# Patient Record
Sex: FEMALE | Race: OTHER | HISPANIC OR LATINO | URBAN - METROPOLITAN AREA
[De-identification: names, ages, dates, MRNs, and addresses within clinical notes are randomized per-mention and may not be internally consistent; named-entity substitution may affect disease eponyms.]

---

## 2017-11-29 ENCOUNTER — EMERGENCY (EMERGENCY)
Facility: HOSPITAL | Age: 82
LOS: 1 days | Discharge: HOME | End: 2017-11-29
Attending: EMERGENCY MEDICINE | Admitting: EMERGENCY MEDICINE
Payer: MEDICARE

## 2017-11-29 VITALS
SYSTOLIC BLOOD PRESSURE: 167 MMHG | HEIGHT: 60 IN | HEART RATE: 81 BPM | TEMPERATURE: 97 F | WEIGHT: 125 LBS | OXYGEN SATURATION: 98 % | DIASTOLIC BLOOD PRESSURE: 89 MMHG | RESPIRATION RATE: 16 BRPM

## 2017-11-29 DIAGNOSIS — N39.0 URINARY TRACT INFECTION, SITE NOT SPECIFIED: ICD-10-CM

## 2017-11-29 DIAGNOSIS — I10 ESSENTIAL (PRIMARY) HYPERTENSION: ICD-10-CM

## 2017-11-29 DIAGNOSIS — R35.0 FREQUENCY OF MICTURITION: ICD-10-CM

## 2017-11-29 DIAGNOSIS — E78.5 HYPERLIPIDEMIA, UNSPECIFIED: ICD-10-CM

## 2017-11-29 LAB
APPEARANCE UR: CLEAR — SIGNIFICANT CHANGE UP
BILIRUB UR-MCNC: NEGATIVE — SIGNIFICANT CHANGE UP
COLOR SPEC: YELLOW — SIGNIFICANT CHANGE UP
DIFF PNL FLD: (no result)
GLUCOSE UR QL: NEGATIVE — SIGNIFICANT CHANGE UP
KETONES UR-MCNC: NEGATIVE — SIGNIFICANT CHANGE UP
LEUKOCYTE ESTERASE UR-ACNC: (no result)
NITRITE UR-MCNC: NEGATIVE — SIGNIFICANT CHANGE UP
PH UR: 6.5 — SIGNIFICANT CHANGE UP (ref 5–8)
PROT UR-MCNC: NEGATIVE MG/DL — SIGNIFICANT CHANGE UP
SP GR SPEC: 1.01 — SIGNIFICANT CHANGE UP (ref 1–1.03)
UROBILINOGEN FLD QL: 0.2 E.U./DL — SIGNIFICANT CHANGE UP

## 2017-11-29 PROCEDURE — 81001 URINALYSIS AUTO W/SCOPE: CPT

## 2017-11-29 PROCEDURE — 99283 EMERGENCY DEPT VISIT LOW MDM: CPT

## 2017-11-29 PROCEDURE — 99284 EMERGENCY DEPT VISIT MOD MDM: CPT

## 2017-11-29 PROCEDURE — 87086 URINE CULTURE/COLONY COUNT: CPT

## 2017-11-29 NOTE — ED ADULT NURSE NOTE - OBJECTIVE STATEMENT
81 y/o female c/o bilateral flank pain worse on the right left side, lower abdominal pain, urinary frequency and burning on urination for "a while." denies chest pain, n/v/d, fever/chills, hematuria.

## 2017-11-29 NOTE — ED ADULT NURSE NOTE - OTHER COMPLAINTS
Reports pain starts in back and radiates to pelvic area. C/o burning on urination. Patient reports symptoms started 2 weeks ago. Denies any fevers.

## 2017-11-29 NOTE — ED PROVIDER NOTE - MEDICAL DECISION MAKING DETAILS
81 y/o female c/o dysuria, urinary frequency/urgency. PE: unremarkable, no CVAT. VS nml. UA shows UTI - will treat and has fu with her PMD.

## 2017-11-29 NOTE — ED ADULT TRIAGE NOTE - CHIEF COMPLAINT QUOTE
"I have pain in my back and in the front. I also have pain in my left leg. I feel something strange when I pee."

## 2017-11-29 NOTE — ED PROVIDER NOTE - CARE PLAN
Principal Discharge DX:	UTI (urinary tract infection)  Instructions for follow-up, activity and diet:	Please follow up with your doctor at [facility].

## 2017-11-29 NOTE — ED PROVIDER NOTE - ATTENDING CONTRIBUTION TO CARE
81 y/o female with a PMH of HTN, MI (x2 stents on clopidogrel), HLD and GERD is present with urinary frequency x1 month. Pt reports multiple trips to the bathroom a day with a burning sensation with urination. In addition pt reports b/l lower back pain with a discomfort in the suprapubic area that is relieved with urination. Pt AAO, NAD, RRR, CTA b/l, abd: soft/NT. UA noted and pt with UTI. Rx given for abx and pt to f/up outpt.

## 2017-11-29 NOTE — ED PROVIDER NOTE - OBJECTIVE STATEMENT
83 y/o female with a PMHx of HTN, MI (x2 stents on clopidogrel), HLD and GERD is present with urinary frequency x1 month. Pt reports multiple trips to the bathroom a day with a burning sensation with urination. In addition pt reports b/l lower back pain with a discomfort in the suprapubic area that is relieved with urination. She saw her PMD a month ago, was told to exercise. She denies the following: fever, chills, abdominal pain, diarrhea, constipation, vaginal discharge, blood in urine.

## 2017-11-30 LAB
CULTURE RESULTS: SIGNIFICANT CHANGE UP
SPECIMEN SOURCE: SIGNIFICANT CHANGE UP

## 2018-02-13 ENCOUNTER — EMERGENCY (EMERGENCY)
Facility: HOSPITAL | Age: 83
LOS: 1 days | Discharge: HOME | End: 2018-02-13
Admitting: EMERGENCY MEDICINE
Payer: MEDICARE

## 2018-02-13 VITALS
WEIGHT: 104.94 LBS | OXYGEN SATURATION: 99 % | RESPIRATION RATE: 18 BRPM | HEART RATE: 91 BPM | SYSTOLIC BLOOD PRESSURE: 158 MMHG | DIASTOLIC BLOOD PRESSURE: 84 MMHG | TEMPERATURE: 97 F

## 2018-02-13 DIAGNOSIS — I10 ESSENTIAL (PRIMARY) HYPERTENSION: ICD-10-CM

## 2018-02-13 DIAGNOSIS — Z79.82 LONG TERM (CURRENT) USE OF ASPIRIN: ICD-10-CM

## 2018-02-13 DIAGNOSIS — H92.01 OTALGIA, RIGHT EAR: ICD-10-CM

## 2018-02-13 DIAGNOSIS — E78.5 HYPERLIPIDEMIA, UNSPECIFIED: ICD-10-CM

## 2018-02-13 DIAGNOSIS — Z79.899 OTHER LONG TERM (CURRENT) DRUG THERAPY: ICD-10-CM

## 2018-02-13 PROCEDURE — 99282 EMERGENCY DEPT VISIT SF MDM: CPT

## 2018-02-13 NOTE — ED PROVIDER NOTE - ENMT, MLM
right ear no auricular tenderness, canal clear, TM intact, no erythema or bulging, no tenderness to TMJ, no dental tenderness, no facial swelling

## 2018-02-13 NOTE — ED ADULT TRIAGE NOTE - CHIEF COMPLAINT QUOTE
right "mandible" pain with eating and talking, and right ear pain right "mandible" pain with eating and talking, and right ear pain for the past month

## 2018-02-13 NOTE — ED PROVIDER NOTE - MEDICAL DECISION MAKING DETAILS
81 y/o right ear pain x 1 month; no evidence of infection, pt well appearing, ambulatory, will arrange ENT f/u, continue tylenol PRN pain

## 2018-02-13 NOTE — ED ADULT NURSE NOTE - OBJECTIVE STATEMENT
Pt presents to ED c/o jaw pain x1 month. Pt reports onset of R jaw pain with eating for one month, then radiation of pain to R ear for the last two weeks. Pt denies dizziness, but states "but my balance hasn't been very good." Pt denies head trauma, dizziness/lightheadedness, visual changes, numbness/tingling, N/V. Pt presents in NAD speaking full sentences ambulatory through triage with steady gait.

## 2019-03-16 ENCOUNTER — EMERGENCY (EMERGENCY)
Facility: HOSPITAL | Age: 84
LOS: 1 days | Discharge: HOME | End: 2019-03-16
Attending: EMERGENCY MEDICINE | Admitting: EMERGENCY MEDICINE
Payer: MEDICARE

## 2019-03-16 VITALS
TEMPERATURE: 98 F | HEART RATE: 96 BPM | OXYGEN SATURATION: 97 % | DIASTOLIC BLOOD PRESSURE: 85 MMHG | SYSTOLIC BLOOD PRESSURE: 142 MMHG | RESPIRATION RATE: 18 BRPM

## 2019-03-16 VITALS
SYSTOLIC BLOOD PRESSURE: 164 MMHG | HEIGHT: 60 IN | HEART RATE: 89 BPM | RESPIRATION RATE: 16 BRPM | TEMPERATURE: 97 F | DIASTOLIC BLOOD PRESSURE: 79 MMHG | OXYGEN SATURATION: 97 % | WEIGHT: 134.92 LBS

## 2019-03-16 PROBLEM — E78.5 HYPERLIPIDEMIA, UNSPECIFIED: Chronic | Status: ACTIVE | Noted: 2018-02-13

## 2019-03-16 PROBLEM — I10 ESSENTIAL (PRIMARY) HYPERTENSION: Chronic | Status: ACTIVE | Noted: 2018-02-13

## 2019-03-16 LAB
ALBUMIN SERPL ELPH-MCNC: 4.3 G/DL — SIGNIFICANT CHANGE UP (ref 3.3–5)
ALP SERPL-CCNC: 104 U/L — SIGNIFICANT CHANGE UP (ref 40–120)
ALT FLD-CCNC: 11 U/L — SIGNIFICANT CHANGE UP (ref 10–45)
ANION GAP SERPL CALC-SCNC: 11 MMOL/L — SIGNIFICANT CHANGE UP (ref 5–17)
APPEARANCE UR: CLEAR — SIGNIFICANT CHANGE UP
AST SERPL-CCNC: 16 U/L — SIGNIFICANT CHANGE UP (ref 10–40)
BACTERIA # UR AUTO: PRESENT /HPF
BASOPHILS # BLD AUTO: 0.03 K/UL — SIGNIFICANT CHANGE UP (ref 0–0.2)
BASOPHILS NFR BLD AUTO: 0.5 % — SIGNIFICANT CHANGE UP (ref 0–2)
BILIRUB SERPL-MCNC: 0.4 MG/DL — SIGNIFICANT CHANGE UP (ref 0.2–1.2)
BILIRUB UR-MCNC: NEGATIVE — SIGNIFICANT CHANGE UP
BUN SERPL-MCNC: 13 MG/DL — SIGNIFICANT CHANGE UP (ref 7–23)
CALCIUM SERPL-MCNC: 9.4 MG/DL — SIGNIFICANT CHANGE UP (ref 8.4–10.5)
CHLORIDE SERPL-SCNC: 104 MMOL/L — SIGNIFICANT CHANGE UP (ref 96–108)
CO2 SERPL-SCNC: 27 MMOL/L — SIGNIFICANT CHANGE UP (ref 22–31)
COLOR SPEC: YELLOW — SIGNIFICANT CHANGE UP
CREAT SERPL-MCNC: 0.8 MG/DL — SIGNIFICANT CHANGE UP (ref 0.5–1.3)
DIFF PNL FLD: NEGATIVE — SIGNIFICANT CHANGE UP
EOSINOPHIL # BLD AUTO: 0.04 K/UL — SIGNIFICANT CHANGE UP (ref 0–0.5)
EOSINOPHIL NFR BLD AUTO: 0.7 % — SIGNIFICANT CHANGE UP (ref 0–6)
EPI CELLS # UR: ABNORMAL /HPF (ref 0–5)
GLUCOSE SERPL-MCNC: 107 MG/DL — HIGH (ref 70–99)
GLUCOSE UR QL: NEGATIVE — SIGNIFICANT CHANGE UP
HCT VFR BLD CALC: 41.6 % — SIGNIFICANT CHANGE UP (ref 34.5–45)
HGB BLD-MCNC: 13.6 G/DL — SIGNIFICANT CHANGE UP (ref 11.5–15.5)
IMM GRANULOCYTES NFR BLD AUTO: 0.2 % — SIGNIFICANT CHANGE UP (ref 0–1.5)
KETONES UR-MCNC: NEGATIVE — SIGNIFICANT CHANGE UP
LEUKOCYTE ESTERASE UR-ACNC: ABNORMAL
LIDOCAIN IGE QN: 21 U/L — SIGNIFICANT CHANGE UP (ref 7–60)
LYMPHOCYTES # BLD AUTO: 2.03 K/UL — SIGNIFICANT CHANGE UP (ref 1–3.3)
LYMPHOCYTES # BLD AUTO: 34.4 % — SIGNIFICANT CHANGE UP (ref 13–44)
MCHC RBC-ENTMCNC: 29.1 PG — SIGNIFICANT CHANGE UP (ref 27–34)
MCHC RBC-ENTMCNC: 32.7 GM/DL — SIGNIFICANT CHANGE UP (ref 32–36)
MCV RBC AUTO: 88.9 FL — SIGNIFICANT CHANGE UP (ref 80–100)
MONOCYTES # BLD AUTO: 0.39 K/UL — SIGNIFICANT CHANGE UP (ref 0–0.9)
MONOCYTES NFR BLD AUTO: 6.6 % — SIGNIFICANT CHANGE UP (ref 2–14)
NEUTROPHILS # BLD AUTO: 3.4 K/UL — SIGNIFICANT CHANGE UP (ref 1.8–7.4)
NEUTROPHILS NFR BLD AUTO: 57.6 % — SIGNIFICANT CHANGE UP (ref 43–77)
NITRITE UR-MCNC: NEGATIVE — SIGNIFICANT CHANGE UP
NRBC # BLD: 0 /100 WBCS — SIGNIFICANT CHANGE UP (ref 0–0)
PH UR: 6.5 — SIGNIFICANT CHANGE UP (ref 5–8)
PLATELET # BLD AUTO: 254 K/UL — SIGNIFICANT CHANGE UP (ref 150–400)
POTASSIUM SERPL-MCNC: 4.4 MMOL/L — SIGNIFICANT CHANGE UP (ref 3.5–5.3)
POTASSIUM SERPL-SCNC: 4.4 MMOL/L — SIGNIFICANT CHANGE UP (ref 3.5–5.3)
PROT SERPL-MCNC: 8.2 G/DL — SIGNIFICANT CHANGE UP (ref 6–8.3)
PROT UR-MCNC: NEGATIVE MG/DL — SIGNIFICANT CHANGE UP
RBC # BLD: 4.68 M/UL — SIGNIFICANT CHANGE UP (ref 3.8–5.2)
RBC # FLD: 12.2 % — SIGNIFICANT CHANGE UP (ref 10.3–14.5)
RBC CASTS # UR COMP ASSIST: < 5 /HPF — SIGNIFICANT CHANGE UP
SODIUM SERPL-SCNC: 142 MMOL/L — SIGNIFICANT CHANGE UP (ref 135–145)
SP GR SPEC: 1.01 — SIGNIFICANT CHANGE UP (ref 1–1.03)
UROBILINOGEN FLD QL: 0.2 E.U./DL — SIGNIFICANT CHANGE UP
WBC # BLD: 5.9 K/UL — SIGNIFICANT CHANGE UP (ref 3.8–10.5)
WBC # FLD AUTO: 5.9 K/UL — SIGNIFICANT CHANGE UP (ref 3.8–10.5)
WBC UR QL: > 10 /HPF

## 2019-03-16 PROCEDURE — 80053 COMPREHEN METABOLIC PANEL: CPT

## 2019-03-16 PROCEDURE — 99284 EMERGENCY DEPT VISIT MOD MDM: CPT

## 2019-03-16 PROCEDURE — 83690 ASSAY OF LIPASE: CPT

## 2019-03-16 PROCEDURE — 85025 COMPLETE CBC W/AUTO DIFF WBC: CPT

## 2019-03-16 PROCEDURE — 81001 URINALYSIS AUTO W/SCOPE: CPT

## 2019-03-16 PROCEDURE — 96374 THER/PROPH/DIAG INJ IV PUSH: CPT | Mod: XU

## 2019-03-16 PROCEDURE — 36415 COLL VENOUS BLD VENIPUNCTURE: CPT

## 2019-03-16 PROCEDURE — 74177 CT ABD & PELVIS W/CONTRAST: CPT

## 2019-03-16 PROCEDURE — 99284 EMERGENCY DEPT VISIT MOD MDM: CPT | Mod: 25

## 2019-03-16 PROCEDURE — 74177 CT ABD & PELVIS W/CONTRAST: CPT | Mod: 26

## 2019-03-16 PROCEDURE — 87086 URINE CULTURE/COLONY COUNT: CPT

## 2019-03-16 RX ORDER — CEPHALEXIN 500 MG
1 CAPSULE ORAL
Qty: 14 | Refills: 0
Start: 2019-03-16 | End: 2019-03-22

## 2019-03-16 RX ORDER — FAMOTIDINE 10 MG/ML
20 INJECTION INTRAVENOUS ONCE
Qty: 0 | Refills: 0 | Status: COMPLETED | OUTPATIENT
Start: 2019-03-16 | End: 2019-03-16

## 2019-03-16 RX ORDER — IOHEXOL 300 MG/ML
30 INJECTION, SOLUTION INTRAVENOUS ONCE
Qty: 0 | Refills: 0 | Status: COMPLETED | OUTPATIENT
Start: 2019-03-16 | End: 2019-03-16

## 2019-03-16 RX ORDER — SODIUM CHLORIDE 9 MG/ML
1000 INJECTION INTRAMUSCULAR; INTRAVENOUS; SUBCUTANEOUS ONCE
Qty: 0 | Refills: 0 | Status: COMPLETED | OUTPATIENT
Start: 2019-03-16 | End: 2019-03-16

## 2019-03-16 RX ADMIN — SODIUM CHLORIDE 1000 MILLILITER(S): 9 INJECTION INTRAMUSCULAR; INTRAVENOUS; SUBCUTANEOUS at 16:18

## 2019-03-16 RX ADMIN — IOHEXOL 30 MILLILITER(S): 300 INJECTION, SOLUTION INTRAVENOUS at 16:20

## 2019-03-16 RX ADMIN — FAMOTIDINE 20 MILLIGRAM(S): 10 INJECTION INTRAVENOUS at 16:20

## 2019-03-16 NOTE — ED ADULT NURSE NOTE - OBJECTIVE STATEMENT
Pt presents to ED with c/o lower abdominal pain radiating to back x1 month. Denies nvd, urinary sx, or vaginal discharge/bleeding. No fever/chills.

## 2019-03-16 NOTE — ED PROVIDER NOTE - NSFOLLOWUPINSTRUCTIONS_ED_ALL_ED_FT
Please see your primary care provider in 24-48 hours.  Return to the ER if symptoms worsen or other concerns.  May take tylenol 2 pills every 6 hours as needed for mild/ moderate pain    Urinary Tract Infection    A urinary tract infection (UTI) is an infection of any part of the urinary tract, which includes the kidneys, ureters, bladder, and urethra. Risk factors include ignoring your need to urinate, wiping back to front if female, being an uncircumcised male, and having diabetes or a weak immune system. Symptoms include frequent urination, pain or burning with urination, foul smelling urine, cloudy urine, pain in the lower abdomen, blood in the urine, and fever. If you were prescribed an antibiotic medicine, take it as told by your health care provider. Do not stop taking the antibiotic even if you start to feel better.    SEEK IMMEDIATE MEDICAL CARE IF YOU HAVE ANY OF THE FOLLOWING SYMPTOMS: severe back or abdominal pain, fever, inability to keep fluids or medicine down, dizziness/lightheadedness, or a change in mental status.    Acute Abdominal Pain    WHAT YOU NEED TO KNOW:    The cause of your abdominal pain may not be found. If a cause is found, treatment will depend on what the cause is.     DISCHARGE INSTRUCTIONS:    Return to the emergency department if:     You vomit blood or cannot stop vomiting.      You have blood in your bowel movement or it looks like tar.       You have bleeding from your rectum.       Your abdomen is larger than usual, more painful, and hard.       You have severe pain in your abdomen.       You stop passing gas and having bowel movements.       You feel weak, dizzy, or faint.    Contact your healthcare provider if:     You have a fever.      You have new signs and symptoms.      Your symptoms do not get better with treatment.       You have questions or concerns about your condition or care.    Medicines may be given to decrease pain, treat an infection, and manage your symptoms. Take your medicine as directed. Call your healthcare provider if you think your medicine is not helping or if you have side effects. Tell him if you are allergic to any medicine. Keep a list of the medicines, vitamins, and herbs you take. Include the amounts, and when and why you take them. Bring the list or the pill bottles to follow-up visits. Carry your medicine list with you in case of an emergency.    Manage your symptoms:     Apply heat on your abdomen for 20 to 30 minutes every 2 hours for as many days as directed. Heat helps decrease pain and muscle spasms.       Manage your stress. Stress may cause abdominal pain. Your healthcare provider may recommend relaxation techniques and deep breathing exercises to help decrease your stress. Your healthcare provider may recommend you talk to someone about your stress or anxiety, such as a counselor or a trusted friend. Get plenty of sleep and exercise regularly.       Limit or do not drink alcohol. Alcohol can make your abdominal pain worse. Ask your healthcare provider if it is safe for you to drink alcohol. Also ask how much is safe for you to drink.       Do not smoke. Nicotine and other chemicals in cigarettes can damage your esophagus and stomach. Ask your healthcare provider for information if you currently smoke and need help to quit. E-cigarettes or smokeless tobacco still contain nicotine. Talk to your healthcare provider before you use these products.     Make changes to the food you eat as directed: Do not eat foods that cause abdominal pain or other symptoms. Eat small meals more often.     Eat more high-fiber foods if you are constipated. High-fiber foods include fruits, vegetables, whole-grain foods, and legumes.       Do not eat foods that cause gas if you have bloating. Examples include broccoli, cabbage, and cauliflower. Do not drink soda or carbonated drinks, because these may also cause gas.       Do not eat foods or drinks that contain sorbitol or fructose if you have diarrhea and bloating. Some examples are fruit juices, candy, jelly, and sugar-free gum.       Do not eat high-fat foods, such as fried foods, cheeseburgers, hot dogs, and desserts.      Limit or do not drink caffeine. Caffeine may make symptoms, such as heart burn or nausea, worse.       Drink plenty of liquids to prevent dehydration from diarrhea or vomiting. Ask your healthcare provider how much liquid to drink each day and which liquids are best for you.     Follow up with your healthcare provider as directed: Write down your questions so you remember to ask them during your visits.        SEEK IMMEDIATE MEDICAL CARE IF YOU HAVE ANY OF THE FOLLOWING SYMPTOMS: worsening abdominal pain, uncontrollable vomiting, profuse diarrhea, inability to have bowel movements or pass gas, black or bloody stools, fever accompanying chest pain or back pain, or fainting. These symptoms may represent a serious problem that is an emergency. Do not wait to see if the symptoms will go away. Get medical help right away. Call 911 and do not drive yourself to the hospital.    Vertebral Compression Fracture    WHAT YOU NEED TO KNOW:    A vertebral compression fracture (VCF) is a break in a part of the vertebra. Vertebrae are the round, strong bones that form your spine. VCFs most often occur in the thoracic (middle) and lumbar (lower) areas of your spine. Fractures may be mild to severe.Compression Fracture of Spine         DISCHARGE INSTRUCTIONS:    Medicines: You may need any of the following:     NSAIDs, such as ibuprofen, help decrease swelling, pain, and fever. This medicine is available with or without a doctor's order. NSAIDs can cause stomach bleeding or kidney problems in certain people. If you take blood thinner medicine, always ask if NSAIDs are safe for you. Always read the medicine label and follow directions. Do not give these medicines to children under 6 months of age without direction from your child's healthcare provider.      Acetaminophen decreases pain and fever. It is available without a doctor's order. Ask how much to take and how often to take it. Follow directions. Acetaminophen can cause liver damage if not taken correctly.      Prescription pain medicine may be given. Ask your healthcare provider how to take this medicine safely.      Bisphosphonates and calcitonin may be recommended to help your bones get stronger. They can decrease the pain of a VCF caused by osteoporosis, and decrease your risk for another fracture.       Take your medicine as directed. Contact your healthcare provider if you think your medicine is not helping or if you have side effects. Tell him or her if you are allergic to any medicine. Keep a list of the medicines, vitamins, and herbs you take. Include the amounts, and when and why you take them. Bring the list or the pill bottles to follow-up visits. Carry your medicine list with you in case of an emergency.    Follow up with your healthcare provider as directed: You may need to return for x-rays or other tests. Write down your questions so you remember to ask them during your visits.     Heat and ice:     Apply ice on your back for 15 to 20 minutes every hour or as directed. Use an ice pack, or put crushed ice in a plastic bag. Cover it with a towel. Ice helps prevent tissue damage and decreases swelling and pain.      Apply heat on your back for 20 to 30 minutes every 2 hours for as many days as directed. Heat helps decrease pain and muscle spasms.    Activity:     Avoid activities that may make the pain worse, such as picking up heavy objects. When the pain decreases, begin normal, slow movements as directed by your healthcare provider. Your healthcare provider may have you do weight-bearing exercises such as walking. You may also do non-weight-bearing exercises such as swimming and bicycling.       You may need to use a walker or cane. Ask your healthcare provider for more information about how to use a cane or a walker.      When you  objects, bend at the hips and knees. Never bend from the waist only. Use bent knees and your leg muscles as you lift the object. While you lift the object, keep it close to your chest. Try not to twist or lift anything above your waist.    Physical and occupational therapy: Your healthcare provider may recommend physical and occupational therapy. A physical therapist teaches you exercises to help improve movement and strength, and to decrease pain. An occupational therapist teaches you skills to help with your daily activities.     Manage pain during sleep:     Do not sleep on a waterbed. Waterbeds do not provide good back support.      Sleep on a firm mattress. You may also put a ½ to 1-inch piece of plywood between the mattress and box spring.      Sleep on your back with a pillow under your knees. This will decrease pressure on your back. You may also sleep on your side with 1 or both of your knees bent and a pillow between them. It may also be helpful to sleep on your stomach with a pillow under you at waist level.    Contact your healthcare provider if:     You are not hungry, and you are losing weight.      You cannot sleep or rest because of back pain.      You have pain or swelling in your back that is getting worse, or does not go away.      You have questions or concerns about your condition or care.    Return to the emergency department if:     You feel lightheaded, short of breath, and have chest pain.      You cough up blood.      Your arm or leg feels warm, tender, and painful. It may look swollen and red.      You have new problems urinating or having bowel movements.      You have severe pain in your back after falling, bending forward, sneezing, or coughing strongly.      You suddenly cannot feel your legs.      You suddenly have trouble moving your arms or legs.

## 2019-03-16 NOTE — ED PROVIDER NOTE - CLINICAL SUMMARY MEDICAL DECISION MAKING FREE TEXT BOX
----- Message from Kaye Purvis sent at 2/9/2018  8:45 AM EST -----  Contact: Patient   Patient called requesting refill on     carvedilol (COREG) 6.25 MG tablet.      Patient going out of town tomorrow and will be out of med before he gets back home.  Please advise.      Patient:  [phone]    Pharmacy:  [address] [place] [address] [phone] [facility] [phone]    vague intermittent abdominal pain for a month.  abd with mild suprapubic tenderness, labs suggestive of uti.  will treat with keflex.  ct done and no acute pathology except age indeterminate compression fracture which may be contributing.  prn tylenol.

## 2019-03-16 NOTE — ED PROVIDER NOTE - OBJECTIVE STATEMENT
here with lower abdominal pain radiating to back for past month.  Described as crampy.  Intermittent.  No f/c, n/v/d, urinary symptoms (other than chronic frequency), vaginal bleeding or discharge.  Says it feels like it "is in inside."

## 2019-03-18 LAB
CULTURE RESULTS: SIGNIFICANT CHANGE UP
SPECIMEN SOURCE: SIGNIFICANT CHANGE UP

## 2019-03-20 DIAGNOSIS — Z79.899 OTHER LONG TERM (CURRENT) DRUG THERAPY: ICD-10-CM

## 2019-03-20 DIAGNOSIS — N39.0 URINARY TRACT INFECTION, SITE NOT SPECIFIED: ICD-10-CM

## 2019-03-20 DIAGNOSIS — E78.5 HYPERLIPIDEMIA, UNSPECIFIED: ICD-10-CM

## 2019-03-20 DIAGNOSIS — I10 ESSENTIAL (PRIMARY) HYPERTENSION: ICD-10-CM

## 2019-03-20 DIAGNOSIS — Z79.82 LONG TERM (CURRENT) USE OF ASPIRIN: ICD-10-CM

## 2019-03-20 DIAGNOSIS — R10.30 LOWER ABDOMINAL PAIN, UNSPECIFIED: ICD-10-CM

## 2022-05-03 ENCOUNTER — INPATIENT (INPATIENT)
Facility: HOSPITAL | Age: 87
LOS: 1 days | Discharge: HOME | DRG: 392 | End: 2022-05-05
Attending: SURGERY | Admitting: SURGERY
Payer: MEDICARE

## 2022-05-03 VITALS
DIASTOLIC BLOOD PRESSURE: 81 MMHG | SYSTOLIC BLOOD PRESSURE: 138 MMHG | HEART RATE: 118 BPM | TEMPERATURE: 98 F | OXYGEN SATURATION: 95 % | RESPIRATION RATE: 18 BRPM | HEIGHT: 60 IN

## 2022-05-03 DIAGNOSIS — R10.9 UNSPECIFIED ABDOMINAL PAIN: ICD-10-CM

## 2022-05-03 LAB
ALBUMIN SERPL ELPH-MCNC: 3.9 G/DL — SIGNIFICANT CHANGE UP (ref 3.3–5)
ALP SERPL-CCNC: 136 U/L — HIGH (ref 40–120)
ALT FLD-CCNC: 324 U/L — HIGH (ref 10–45)
ANION GAP SERPL CALC-SCNC: 12 MMOL/L — SIGNIFICANT CHANGE UP (ref 5–17)
APPEARANCE UR: CLEAR — SIGNIFICANT CHANGE UP
AST SERPL-CCNC: 453 U/L — HIGH (ref 10–40)
BACTERIA # UR AUTO: PRESENT /HPF
BASOPHILS # BLD AUTO: 0.11 K/UL — SIGNIFICANT CHANGE UP (ref 0–0.2)
BASOPHILS NFR BLD AUTO: 0.9 % — SIGNIFICANT CHANGE UP (ref 0–2)
BILIRUB SERPL-MCNC: 1.2 MG/DL — SIGNIFICANT CHANGE UP (ref 0.2–1.2)
BILIRUB UR-MCNC: NEGATIVE — SIGNIFICANT CHANGE UP
BLD GP AB SCN SERPL QL: NEGATIVE — SIGNIFICANT CHANGE UP
BUN SERPL-MCNC: 11 MG/DL — SIGNIFICANT CHANGE UP (ref 7–23)
CALCIUM SERPL-MCNC: 8.9 MG/DL — SIGNIFICANT CHANGE UP (ref 8.4–10.5)
CHLORIDE SERPL-SCNC: 102 MMOL/L — SIGNIFICANT CHANGE UP (ref 96–108)
CO2 SERPL-SCNC: 23 MMOL/L — SIGNIFICANT CHANGE UP (ref 22–31)
COLOR SPEC: YELLOW — SIGNIFICANT CHANGE UP
CREAT SERPL-MCNC: 0.65 MG/DL — SIGNIFICANT CHANGE UP (ref 0.5–1.3)
DIFF PNL FLD: ABNORMAL
EGFR: 86 ML/MIN/1.73M2 — SIGNIFICANT CHANGE UP
EOSINOPHIL # BLD AUTO: 0 K/UL — SIGNIFICANT CHANGE UP (ref 0–0.5)
EOSINOPHIL NFR BLD AUTO: 0 % — SIGNIFICANT CHANGE UP (ref 0–6)
EPI CELLS # UR: SIGNIFICANT CHANGE UP /HPF (ref 0–5)
GIANT PLATELETS BLD QL SMEAR: PRESENT — SIGNIFICANT CHANGE UP
GLUCOSE SERPL-MCNC: 136 MG/DL — HIGH (ref 70–99)
GLUCOSE UR QL: NEGATIVE — SIGNIFICANT CHANGE UP
HCT VFR BLD CALC: 39.1 % — SIGNIFICANT CHANGE UP (ref 34.5–45)
HGB BLD-MCNC: 12.6 G/DL — SIGNIFICANT CHANGE UP (ref 11.5–15.5)
KETONES UR-MCNC: 15 MG/DL
LACTATE SERPL-SCNC: 1.3 MMOL/L — SIGNIFICANT CHANGE UP (ref 0.5–2)
LEUKOCYTE ESTERASE UR-ACNC: ABNORMAL
LIDOCAIN IGE QN: 19 U/L — SIGNIFICANT CHANGE UP (ref 7–60)
LYMPHOCYTES # BLD AUTO: 0.45 K/UL — LOW (ref 1–3.3)
LYMPHOCYTES # BLD AUTO: 3.5 % — LOW (ref 13–44)
MANUAL SMEAR VERIFICATION: SIGNIFICANT CHANGE UP
MCHC RBC-ENTMCNC: 27.5 PG — SIGNIFICANT CHANGE UP (ref 27–34)
MCHC RBC-ENTMCNC: 32.2 GM/DL — SIGNIFICANT CHANGE UP (ref 32–36)
MCV RBC AUTO: 85.2 FL — SIGNIFICANT CHANGE UP (ref 80–100)
MONOCYTES # BLD AUTO: 0.66 K/UL — SIGNIFICANT CHANGE UP (ref 0–0.9)
MONOCYTES NFR BLD AUTO: 5.2 % — SIGNIFICANT CHANGE UP (ref 2–14)
NEUTROPHILS # BLD AUTO: 11.51 K/UL — HIGH (ref 1.8–7.4)
NEUTROPHILS NFR BLD AUTO: 84.3 % — HIGH (ref 43–77)
NEUTS BAND # BLD: 6.1 % — SIGNIFICANT CHANGE UP (ref 0–8)
NITRITE UR-MCNC: NEGATIVE — SIGNIFICANT CHANGE UP
PH UR: 7 — SIGNIFICANT CHANGE UP (ref 5–8)
PLAT MORPH BLD: NORMAL — SIGNIFICANT CHANGE UP
PLATELET # BLD AUTO: 178 K/UL — SIGNIFICANT CHANGE UP (ref 150–400)
POTASSIUM SERPL-MCNC: 3.7 MMOL/L — SIGNIFICANT CHANGE UP (ref 3.5–5.3)
POTASSIUM SERPL-SCNC: 3.7 MMOL/L — SIGNIFICANT CHANGE UP (ref 3.5–5.3)
PROT SERPL-MCNC: 6.9 G/DL — SIGNIFICANT CHANGE UP (ref 6–8.3)
PROT UR-MCNC: NEGATIVE MG/DL — SIGNIFICANT CHANGE UP
RBC # BLD: 4.59 M/UL — SIGNIFICANT CHANGE UP (ref 3.8–5.2)
RBC # FLD: 12.6 % — SIGNIFICANT CHANGE UP (ref 10.3–14.5)
RBC BLD AUTO: NORMAL — SIGNIFICANT CHANGE UP
RBC CASTS # UR COMP ASSIST: < 5 /HPF — SIGNIFICANT CHANGE UP
RH IG SCN BLD-IMP: POSITIVE — SIGNIFICANT CHANGE UP
SARS-COV-2 RNA SPEC QL NAA+PROBE: NEGATIVE — SIGNIFICANT CHANGE UP
SODIUM SERPL-SCNC: 137 MMOL/L — SIGNIFICANT CHANGE UP (ref 135–145)
SP GR SPEC: 1.01 — SIGNIFICANT CHANGE UP (ref 1–1.03)
UROBILINOGEN FLD QL: 0.2 E.U./DL — SIGNIFICANT CHANGE UP
WBC # BLD: 12.73 K/UL — HIGH (ref 3.8–10.5)
WBC # FLD AUTO: 12.73 K/UL — HIGH (ref 3.8–10.5)
WBC UR QL: ABNORMAL /HPF

## 2022-05-03 PROCEDURE — 74177 CT ABD & PELVIS W/CONTRAST: CPT | Mod: 26,MA

## 2022-05-03 PROCEDURE — 93010 ELECTROCARDIOGRAM REPORT: CPT

## 2022-05-03 PROCEDURE — 99285 EMERGENCY DEPT VISIT HI MDM: CPT

## 2022-05-03 PROCEDURE — 76705 ECHO EXAM OF ABDOMEN: CPT | Mod: 26

## 2022-05-03 PROCEDURE — 99284 EMERGENCY DEPT VISIT MOD MDM: CPT

## 2022-05-03 RX ORDER — HEPARIN SODIUM 5000 [USP'U]/ML
5000 INJECTION INTRAVENOUS; SUBCUTANEOUS EVERY 8 HOURS
Refills: 0 | Status: DISCONTINUED | OUTPATIENT
Start: 2022-05-03 | End: 2022-05-05

## 2022-05-03 RX ORDER — ACETAMINOPHEN 500 MG
650 TABLET ORAL ONCE
Refills: 0 | Status: COMPLETED | OUTPATIENT
Start: 2022-05-03 | End: 2022-05-03

## 2022-05-03 RX ORDER — METOPROLOL TARTRATE 50 MG
10 TABLET ORAL DAILY
Refills: 0 | Status: DISCONTINUED | OUTPATIENT
Start: 2022-05-04 | End: 2022-05-04

## 2022-05-03 RX ORDER — ACETAMINOPHEN 500 MG
1000 TABLET ORAL EVERY 6 HOURS
Refills: 0 | Status: DISCONTINUED | OUTPATIENT
Start: 2022-05-03 | End: 2022-05-05

## 2022-05-03 RX ORDER — IOHEXOL 300 MG/ML
30 INJECTION, SOLUTION INTRAVENOUS ONCE
Refills: 0 | Status: COMPLETED | OUTPATIENT
Start: 2022-05-03 | End: 2022-05-03

## 2022-05-03 RX ORDER — LEVOTHYROXINE SODIUM 125 MCG
50 TABLET ORAL DAILY
Refills: 0 | Status: DISCONTINUED | OUTPATIENT
Start: 2022-05-03 | End: 2022-05-05

## 2022-05-03 RX ORDER — METRONIDAZOLE 500 MG
500 TABLET ORAL ONCE
Refills: 0 | Status: COMPLETED | OUTPATIENT
Start: 2022-05-03 | End: 2022-05-03

## 2022-05-03 RX ORDER — SODIUM CHLORIDE 9 MG/ML
500 INJECTION INTRAMUSCULAR; INTRAVENOUS; SUBCUTANEOUS ONCE
Refills: 0 | Status: COMPLETED | OUTPATIENT
Start: 2022-05-03 | End: 2022-05-03

## 2022-05-03 RX ORDER — CEFTRIAXONE 500 MG/1
1000 INJECTION, POWDER, FOR SOLUTION INTRAMUSCULAR; INTRAVENOUS ONCE
Refills: 0 | Status: COMPLETED | OUTPATIENT
Start: 2022-05-03 | End: 2022-05-03

## 2022-05-03 RX ORDER — AMLODIPINE BESYLATE 2.5 MG/1
10 TABLET ORAL DAILY
Refills: 0 | Status: DISCONTINUED | OUTPATIENT
Start: 2022-05-03 | End: 2022-05-05

## 2022-05-03 RX ORDER — SODIUM CHLORIDE 9 MG/ML
1000 INJECTION, SOLUTION INTRAVENOUS
Refills: 0 | Status: DISCONTINUED | OUTPATIENT
Start: 2022-05-03 | End: 2022-05-05

## 2022-05-03 RX ORDER — ONDANSETRON 8 MG/1
4 TABLET, FILM COATED ORAL EVERY 6 HOURS
Refills: 0 | Status: DISCONTINUED | OUTPATIENT
Start: 2022-05-03 | End: 2022-05-05

## 2022-05-03 RX ORDER — ATORVASTATIN CALCIUM 80 MG/1
20 TABLET, FILM COATED ORAL AT BEDTIME
Refills: 0 | Status: DISCONTINUED | OUTPATIENT
Start: 2022-05-03 | End: 2022-05-05

## 2022-05-03 RX ADMIN — SODIUM CHLORIDE 500 MILLILITER(S): 9 INJECTION INTRAMUSCULAR; INTRAVENOUS; SUBCUTANEOUS at 18:15

## 2022-05-03 RX ADMIN — SODIUM CHLORIDE 500 MILLILITER(S): 9 INJECTION INTRAMUSCULAR; INTRAVENOUS; SUBCUTANEOUS at 16:20

## 2022-05-03 RX ADMIN — IOHEXOL 30 MILLILITER(S): 300 INJECTION, SOLUTION INTRAVENOUS at 16:20

## 2022-05-03 NOTE — ED PROVIDER NOTE - CADM POA PRESS ULCER
0730: Bedside and Verbal shift change report given to Jose Solis RN (oncoming nurse) by Sonali Edwards RN (Ta Preciado. This RN will inform. Wife Ibrahima Noble RN (oncoming nurse) by Andrea Adame RN (offgoing nurse). Report included the following information SBAR, Kardex, Intake/Output, MAR, Recent Results, Med Rec Status, Cardiac Rhythm NSR and Alarm Parameters . No

## 2022-05-03 NOTE — ED PROVIDER NOTE - PROGRESS NOTE DETAILS
Klepfish: Received s/o pending CT/US. Labs had shown mild transaminitis. UA w/ small leuk esterase, 5-10 WBCs. Has no urinary complaints, will hold on tx, urine cx pending. US w/ no acute pathology. Pain overall improving. CT results pending. Updated pt/son. Will reassess. Klepfish: CT prelim showing "... GALLBLADDER: There is gallbladder wall thickening with single small   radiopaque stone seen... IMPRESSION: Gallbladder wall thickening which may represent acute cholecystitis in the appropriate clinical context." Pt currently pain free, however does have mild RUQ TTP. Though US was normal, given abd ttp, mild transaminitis, and CT findings and no other obvious source of pt's pain, will consult surgery for possible cholecystitis. Updated pt/son at bedside. dannie: surgery reccs for ctx/flagyl and admission to surgery/regional under dr gimenez.

## 2022-05-03 NOTE — ED PROVIDER NOTE - SHIFT CHANGE DETAILS
87F found to have transaminitis and poss acute luis on ct. avss. pain controlled. surgery consulted.     dispo: pending surgery reccs

## 2022-05-03 NOTE — H&P ADULT - HISTORY OF PRESENT ILLNESS
88 yo F PMH of HTN, HLD, cardiac stents X2 were put 7 years ago (her cardiologist Dr. Kale perkins advised her that she does not need to be on ASA or plavix anymore). she presented to the ED today for 24 hrs hx of abd pain, emesis X2 NBNB, and black colored diarrhea (she indicated that her stool is black at baseline). She denied hx of gallstones.     PSH: no prior abdominal surgeries   In the ED, she is AFVSS. WBC 12.7 K, Cr 0.6, Tbili 1.2, , , Alk P    136. CT showed GB wall thickening and one gallstone. Abd US showed normal GB.

## 2022-05-03 NOTE — H&P ADULT - NSHPLABSRESULTS_GEN_ALL_CORE
12.6   12.73 )-----------( 178      ( 03 May 2022 16:31 )             39.1         137  |  102  |  11  ----------------------------<  136<H>  3.7   |  23  |  0.65    Ca    8.9      03 May 2022 16:31    TPro  6.9  /  Alb  3.9  /  TBili  1.2  /  DBili  x   /  AST  453<H>  /  ALT  324<H>  /  AlkPhos  136<H>        Urinalysis Basic - ( 03 May 2022 19:13 )    Color: Yellow / Appearance: Clear / S.010 / pH: x  Gluc: x / Ketone: 15 mg/dL  / Bili: Negative / Urobili: 0.2 E.U./dL   Blood: x / Protein: NEGATIVE mg/dL / Nitrite: NEGATIVE   Leuk Esterase: Small / RBC: < 5 /HPF / WBC 5-10 /HPF   Sq Epi: x / Non Sq Epi: 0-5 /HPF / Bacteria: Present /HPF        RADIOLOGY & ADDITIONAL STUDIES:  CT Abdomen and Pelvis w/ Oral Cont and w/ IV Cont:   ******PRELIMINARY REPORT******      ******PRELIMINARY REPORT******       ACC: [id number] EXAM:  CT ABDOMEN AND PELVIS OC IC                          PROCEDURE DATE:  2022    ******PRELIMINARY REPORT******      ******PRELIMINARY REPORT******           INTERPRETATION:  CLINICAL INFORMATION: Abdominal pain    COMPARISON: CT abdomen and pelvis 2019    CONTRAST/COMPLICATIONS:  IV Contrast: Isovue 370  80 cc administered  Oral Contrast: NONE  Complications: None reported at time of study completion    PROCEDURE:  CT of the Abdomen and Pelvis was performed.  Sagittal and coronal reformats were performed.    FINDINGS:  LOWER CHEST: Severe coronary artery calcification. Calcified granulomas   right lung base.    LIVER: Within normal limits.  BILE DUCTS: Normal caliber.  GALLBLADDER: There is gallbladder wall thickening with single small   radiopaque stone seen.  SPLEEN: Within normal limits.  PANCREAS: Within normal limits.  ADRENALS: Within normal limits.  KIDNEYS/URETERS: Within normal limits.    BLADDER: Within normal limits.  REPRODUCTIVE ORGANS: Normal uterus and adnexa.    BOWEL: No pneumoperitoneum or bowel obstruction. Unchanged moderate   hiatal hernia. Colonic diverticula. Appendix is normal.  PERITONEUM: Trace within pelvis  VESSELS: Moderate calcified plaque aorta and iliacs  RETROPERITONEUM/LYMPH NODES: No lymphadenopathy.  ABDOMINAL WALL: Small fat-containing umbilical hernia  BONES: Degenerative changes of the spine. Unchanged superior endplate   deformity L3.      IMPRESSION:  Gallbladder wall thickening which may represent acute cholecystitis in   the appropriate clinical context.

## 2022-05-03 NOTE — ED PROVIDER NOTE - CARE PLAN
1 Principal Discharge DX:	Right sided abdominal pain   Principal Discharge DX:	Right sided abdominal pain  Secondary Diagnosis:	Acute cholecystitis

## 2022-05-03 NOTE — ED ADULT NURSE NOTE - OBJECTIVE STATEMENT
Patient a+o x4 c/o abdominal pain since last night after eating spicy Italian sausage. Patient states has stationary abdominal pain, feeling much better today, did have 2 episodes of vomiting and had diarrhea yesterday, no episodes today. Hx HLD, gerd, htn. Speaking in full sentences without difficulty, denies sob/cp/dizziness/n/v/chills/fever. IV initiated, labs collected and sent. Awaiting radiology.

## 2022-05-03 NOTE — ED PROVIDER NOTE - CLINICAL SUMMARY MEDICAL DECISION MAKING FREE TEXT BOX
Patient in ED w concern for abd pain, n/v/d.  Patient notes symptoms began after eating italian sausage for dinner.  She had several episodes of n/v and diarrhea - all of which resolved yesterday, however abdominal pain persists today.  She denies fever or chills.  + TTP over mid / upper right abd.  US, CT and labs ordered.  Following shift completion, patient's care is handed over to oncoming night team pending review of imaging and re evaluation.  Anticipate discharge home if negative / unremarkable.  Patient is aware of plan and stable at time of transfer of care.

## 2022-05-03 NOTE — ED PROVIDER NOTE - OBJECTIVE STATEMENT
87 year old female with history of HTN, HLD presents to ED with concern for abdominal discomfort 87 year old female with history of HTN, HLD presents to ED with concern for abdominal discomfort that began yesterday following PO intake of sausage with dinner.  Patient states she had two episodes of emesis and several episodes of diarrhea yesterday, however these symptoms have resolved today.  Patient notes she has lingering right mid abdominal discomfort today.  Patient denies associated fever, chills, headache, visual changes, chest pain, shortness of breath, peripheral edema, calf pain/tenderness, recent travel, known sick contacts or any additional acute complaints or concerns at this time.  She notes she has not eaten much today because she has no appetite.

## 2022-05-03 NOTE — ED PROVIDER NOTE - PHYSICAL EXAMINATION
VITAL SIGNS: I have reviewed nursing notes and confirm.  CONSTITUTIONAL: Non toxic; in no acute distress.   SKIN:  Warm and dry, no acute rash.   HEAD:  Normocephalic, atraumatic.  EYES: EOM intact; conjunctiva and sclera clear.  ENT: No nasal discharge; airway clear.   NECK: Supple; non tender.  CARD: S1, S2 normal; no murmurs, gallops, or rubs. Regular rate and rhythm.   RESP:  Clear to auscultation b/l, no wheezes, rales or rhonchi.  ABD: Normal bowel sounds; soft; non-distended; + tenderness to palpation to right upper / mid abdomen; no guarding/rebound.  EXT: Normal ROM. No clubbing, cyanosis or edema. 2+ pulses to b/l ue/le.  NEURO: Alert, oriented, grossly unremarkable.  No facial asymmetry.    PSYCH: Cooperative, mood and affect appropriate.

## 2022-05-03 NOTE — H&P ADULT - ASSESSMENT
86 yo F PMH of cardiac stents, HLD, HTN who is admitted for likely acute luis     regional   IVF, NPO  SCD, HSQ  restarted home meds  MRCP in am   Ceftriaxone , Flagyl

## 2022-05-03 NOTE — ED ADULT TRIAGE NOTE - CHIEF COMPLAINT QUOTE
Pt presents to ED c/o abdominal pain, nausea, vomiting since last night. Denies chest pain, shortness of breath. 12 lead ekg in progress.

## 2022-05-03 NOTE — ED ADULT NURSE NOTE - NSIMPLEMENTINTERV_GEN_ALL_ED
Implemented All Fall with Harm Risk Interventions:  [place] to call system. Call bell, personal items and telephone within reach. Instruct patient to call for assistance. Room bathroom lighting operational. Non-slip footwear when patient is off stretcher. Physically safe environment: no spills, clutter or unnecessary equipment. Stretcher in lowest position, wheels locked, appropriate side rails in place. Provide visual cue, wrist band, yellow gown, etc. Monitor gait and stability. Monitor for mental status changes and reorient to person, place, and time. Review medications for side effects contributing to fall risk. Reinforce activity limits and safety measures with patient and family. Provide visual clues: red socks.

## 2022-05-03 NOTE — H&P ADULT - NSHPPHYSICALEXAM_GEN_ALL_CORE
T(C): 36.9 (03 May 2022 17:49), Max: 36.9 (03 May 2022 17:49)  T(F): 98.5 (03 May 2022 17:49), Max: 98.5 (03 May 2022 17:49)  HR: 76 (03 May 2022 21:40) (68 - 118)  BP: 120/69 (03 May 2022 21:40) (120/69 - 155/91)  BP(mean): --  RR: 17 (03 May 2022 21:40) (17 - 18)  SpO2: 100% (03 May 2022 21:40) (95% - 100%)      NAD, looked appropriate to her stated age  nonlabored breathing   NSR  Abd: soft, ND, epigastric and RUQ tenderness  Ext: WWP, no edema

## 2022-05-03 NOTE — ED PROVIDER NOTE - NS ED ROS FT
Constitutional: No fever or chills.   Eyes: No pain, blurry vision, or discharge.  ENMT: No hearing changes, pain, discharge or infections. No neck pain or stiffness.  Cardiac: No chest pain, SOB or edema. No chest pain with exertion.  Respiratory: No cough or respiratory distress. No hemoptysis. No history of asthma or RAD.  GI: + Nausea, + vomiting, + diarrhea, + abdominal pain.  : No dysuria, frequency or burning.  MS: No myalgia, muscle weakness, joint pain or back pain.  Neuro: No headache or weakness. No LOC.  Skin: No skin rash.   Endocrine: No history of thyroid disease or diabetes.  Except as documented in the HPI, all other systems are negative.

## 2022-05-04 ENCOUNTER — TRANSCRIPTION ENCOUNTER (OUTPATIENT)
Age: 87
End: 2022-05-04

## 2022-05-04 DIAGNOSIS — Z95.5 PRESENCE OF CORONARY ANGIOPLASTY IMPLANT AND GRAFT: Chronic | ICD-10-CM

## 2022-05-04 LAB
ALBUMIN SERPL ELPH-MCNC: 3.6 G/DL — SIGNIFICANT CHANGE UP (ref 3.3–5)
ALP SERPL-CCNC: 123 U/L — HIGH (ref 40–120)
ALT FLD-CCNC: 225 U/L — HIGH (ref 10–45)
ANION GAP SERPL CALC-SCNC: 10 MMOL/L — SIGNIFICANT CHANGE UP (ref 5–17)
AST SERPL-CCNC: 181 U/L — HIGH (ref 10–40)
BILIRUB SERPL-MCNC: 0.7 MG/DL — SIGNIFICANT CHANGE UP (ref 0.2–1.2)
BUN SERPL-MCNC: 7 MG/DL — SIGNIFICANT CHANGE UP (ref 7–23)
CALCIUM SERPL-MCNC: 8.6 MG/DL — SIGNIFICANT CHANGE UP (ref 8.4–10.5)
CHLORIDE SERPL-SCNC: 104 MMOL/L — SIGNIFICANT CHANGE UP (ref 96–108)
CO2 SERPL-SCNC: 24 MMOL/L — SIGNIFICANT CHANGE UP (ref 22–31)
CREAT SERPL-MCNC: 0.73 MG/DL — SIGNIFICANT CHANGE UP (ref 0.5–1.3)
EGFR: 80 ML/MIN/1.73M2 — SIGNIFICANT CHANGE UP
GLUCOSE SERPL-MCNC: 98 MG/DL — SIGNIFICANT CHANGE UP (ref 70–99)
HCT VFR BLD CALC: 38 % — SIGNIFICANT CHANGE UP (ref 34.5–45)
HGB BLD-MCNC: 12.3 G/DL — SIGNIFICANT CHANGE UP (ref 11.5–15.5)
MAGNESIUM SERPL-MCNC: 2 MG/DL — SIGNIFICANT CHANGE UP (ref 1.6–2.6)
MCHC RBC-ENTMCNC: 27.7 PG — SIGNIFICANT CHANGE UP (ref 27–34)
MCHC RBC-ENTMCNC: 32.4 GM/DL — SIGNIFICANT CHANGE UP (ref 32–36)
MCV RBC AUTO: 85.6 FL — SIGNIFICANT CHANGE UP (ref 80–100)
NRBC # BLD: 0 /100 WBCS — SIGNIFICANT CHANGE UP (ref 0–0)
PHOSPHATE SERPL-MCNC: 2.3 MG/DL — LOW (ref 2.5–4.5)
PLATELET # BLD AUTO: 178 K/UL — SIGNIFICANT CHANGE UP (ref 150–400)
POTASSIUM SERPL-MCNC: 3.4 MMOL/L — LOW (ref 3.5–5.3)
POTASSIUM SERPL-SCNC: 3.4 MMOL/L — LOW (ref 3.5–5.3)
PROT SERPL-MCNC: 6.5 G/DL — SIGNIFICANT CHANGE UP (ref 6–8.3)
RBC # BLD: 4.44 M/UL — SIGNIFICANT CHANGE UP (ref 3.8–5.2)
RBC # FLD: 12.9 % — SIGNIFICANT CHANGE UP (ref 10.3–14.5)
SODIUM SERPL-SCNC: 138 MMOL/L — SIGNIFICANT CHANGE UP (ref 135–145)
WBC # BLD: 9.34 K/UL — SIGNIFICANT CHANGE UP (ref 3.8–10.5)
WBC # FLD AUTO: 9.34 K/UL — SIGNIFICANT CHANGE UP (ref 3.8–10.5)

## 2022-05-04 PROCEDURE — 99221 1ST HOSP IP/OBS SF/LOW 40: CPT

## 2022-05-04 PROCEDURE — 99223 1ST HOSP IP/OBS HIGH 75: CPT

## 2022-05-04 PROCEDURE — 78226 HEPATOBILIARY SYSTEM IMAGING: CPT | Mod: 26

## 2022-05-04 RX ORDER — POTASSIUM PHOSPHATE, MONOBASIC POTASSIUM PHOSPHATE, DIBASIC 236; 224 MG/ML; MG/ML
30 INJECTION, SOLUTION INTRAVENOUS ONCE
Refills: 0 | Status: COMPLETED | OUTPATIENT
Start: 2022-05-04 | End: 2022-05-04

## 2022-05-04 RX ORDER — LEVOTHYROXINE SODIUM 125 MCG
0 TABLET ORAL
Qty: 0 | Refills: 0 | DISCHARGE

## 2022-05-04 RX ORDER — OMEPRAZOLE 10 MG/1
1 CAPSULE, DELAYED RELEASE ORAL
Qty: 0 | Refills: 0 | DISCHARGE

## 2022-05-04 RX ORDER — AMLODIPINE BESYLATE 2.5 MG/1
1 TABLET ORAL
Qty: 0 | Refills: 0 | DISCHARGE

## 2022-05-04 RX ORDER — ALBUTEROL 90 UG/1
1 AEROSOL, METERED ORAL DAILY
Refills: 0 | Status: DISCONTINUED | OUTPATIENT
Start: 2022-05-04 | End: 2022-05-05

## 2022-05-04 RX ORDER — ATORVASTATIN CALCIUM 80 MG/1
1 TABLET, FILM COATED ORAL
Qty: 0 | Refills: 0 | DISCHARGE

## 2022-05-04 RX ORDER — CEFTRIAXONE 500 MG/1
1000 INJECTION, POWDER, FOR SOLUTION INTRAMUSCULAR; INTRAVENOUS EVERY 24 HOURS
Refills: 0 | Status: DISCONTINUED | OUTPATIENT
Start: 2022-05-05 | End: 2022-05-05

## 2022-05-04 RX ORDER — LEVOTHYROXINE SODIUM 125 MCG
1 TABLET ORAL
Qty: 0 | Refills: 0 | DISCHARGE

## 2022-05-04 RX ORDER — METRONIDAZOLE 500 MG
500 TABLET ORAL EVERY 8 HOURS
Refills: 0 | Status: DISCONTINUED | OUTPATIENT
Start: 2022-05-04 | End: 2022-05-05

## 2022-05-04 RX ORDER — METOPROLOL TARTRATE 50 MG
10 TABLET ORAL
Qty: 0 | Refills: 0 | DISCHARGE

## 2022-05-04 RX ORDER — AMLODIPINE BESYLATE 2.5 MG/1
0 TABLET ORAL
Qty: 0 | Refills: 0 | DISCHARGE

## 2022-05-04 RX ORDER — CLOPIDOGREL BISULFATE 75 MG/1
0 TABLET, FILM COATED ORAL
Qty: 0 | Refills: 0 | DISCHARGE

## 2022-05-04 RX ORDER — PANTOPRAZOLE SODIUM 20 MG/1
40 TABLET, DELAYED RELEASE ORAL
Refills: 0 | Status: DISCONTINUED | OUTPATIENT
Start: 2022-05-04 | End: 2022-05-05

## 2022-05-04 RX ORDER — ASPIRIN/CALCIUM CARB/MAGNESIUM 324 MG
1 TABLET ORAL
Qty: 0 | Refills: 0 | DISCHARGE

## 2022-05-04 RX ORDER — SINCALIDE 5 UG/5ML
1.2 INJECTION, POWDER, LYOPHILIZED, FOR SOLUTION INTRAVENOUS ONCE
Refills: 0 | Status: COMPLETED | OUTPATIENT
Start: 2022-05-04 | End: 2022-05-04

## 2022-05-04 RX ADMIN — CEFTRIAXONE 100 MILLIGRAM(S): 500 INJECTION, POWDER, FOR SOLUTION INTRAMUSCULAR; INTRAVENOUS at 00:13

## 2022-05-04 RX ADMIN — HEPARIN SODIUM 5000 UNIT(S): 5000 INJECTION INTRAVENOUS; SUBCUTANEOUS at 06:49

## 2022-05-04 RX ADMIN — POTASSIUM PHOSPHATE, MONOBASIC POTASSIUM PHOSPHATE, DIBASIC 83.33 MILLIMOLE(S): 236; 224 INJECTION, SOLUTION INTRAVENOUS at 11:40

## 2022-05-04 RX ADMIN — AMLODIPINE BESYLATE 10 MILLIGRAM(S): 2.5 TABLET ORAL at 06:48

## 2022-05-04 RX ADMIN — Medication 50 MICROGRAM(S): at 06:48

## 2022-05-04 RX ADMIN — SODIUM CHLORIDE 85 MILLILITER(S): 9 INJECTION, SOLUTION INTRAVENOUS at 01:57

## 2022-05-04 RX ADMIN — Medication 100 MILLIGRAM(S): at 01:16

## 2022-05-04 RX ADMIN — HEPARIN SODIUM 5000 UNIT(S): 5000 INJECTION INTRAVENOUS; SUBCUTANEOUS at 21:30

## 2022-05-04 RX ADMIN — Medication 100 MILLIGRAM(S): at 17:18

## 2022-05-04 RX ADMIN — SINCALIDE 102.4 MICROGRAM(S): 5 INJECTION, POWDER, LYOPHILIZED, FOR SOLUTION INTRAVENOUS at 13:36

## 2022-05-04 RX ADMIN — Medication 100 MILLIGRAM(S): at 07:41

## 2022-05-04 RX ADMIN — HEPARIN SODIUM 5000 UNIT(S): 5000 INJECTION INTRAVENOUS; SUBCUTANEOUS at 13:43

## 2022-05-04 RX ADMIN — ATORVASTATIN CALCIUM 20 MILLIGRAM(S): 80 TABLET, FILM COATED ORAL at 21:30

## 2022-05-04 RX ADMIN — PANTOPRAZOLE SODIUM 40 MILLIGRAM(S): 20 TABLET, DELAYED RELEASE ORAL at 19:40

## 2022-05-04 NOTE — PROGRESS NOTE ADULT - ASSESSMENT
86 yo F PMH of cardiac stents, HLD, HTN w/ abd pain and emesis, CT showed GB wall thickening and one gallstone. Abd US showed normal GB. Admitted w/ c/f acute luis    NPO/IVF  Cef/flagyl  Pain/nausea control  SCD/OOBA/HSQ  Home meds  AM labs  MRCP

## 2022-05-04 NOTE — CONSULT NOTE ADULT - SUBJECTIVE AND OBJECTIVE BOX
HPI:  86 yo F PMH of HTN, HLD, cardiac stents X2 ( placed7 years ago - her cardiologist Dr. Kale Colin advised her that she does not need to be on ASA or plavix anymore) who presents with abdominal pain, found to have acute cholecystitis with plan for lap luis. Cardiology consulted for pre-op.     #Pre-op  Patient with CAD s/p stents not on ASA or plavix per outpatient cardiologist.  - patient is a high risk patient for an intermediate risk procedure  - pending TTE      03 May 2022 23:54)    PAST MEDICAL & SURGICAL HISTORY:  Hyperlipidemia    Hypertension    H/O heart artery stent  X2        PREVIOUS DIAGNOSTIC TESTING:    [ ] Echocardiogram:  [ ] Stress Test:  [ ] Catheterization: 	    FAMILY HISTORY:      Social History:  Never smoker, no Etoh (03 May 2022 23:54)      ALLERGIES/INTOLERANCES:  No Known Allergies    HOME MEDICATIONS:    INPATIENT MEDICATIONS:  amLODIPine   Tablet 10 milliGRAM(s) Oral daily    heparin   Injectable 5000 Unit(s) SubCutaneous every 8 hours    acetaminophen     Tablet .. 1000 milliGRAM(s) Oral every 6 hours PRN  atorvastatin 20 milliGRAM(s) Oral at bedtime  lactated ringers. 1000 milliLiter(s) IV Continuous <Continuous>  levothyroxine 50 MICROGram(s) Oral daily  metroNIDAZOLE  IVPB 500 milliGRAM(s) IV Intermittent every 8 hours  ondansetron Injectable 4 milliGRAM(s) IV Push every 6 hours PRN  potassium phosphate IVPB 30 milliMole(s) IV Intermittent once      REVIEW OF SYSTEMS: See HPI     PHYSICAL EXAM:  Gen: NAD   HEENT: EOMI   Neck: Flat JVP   Cor: Normal s1, s2. RRR.   Abd: soft, non-tender, non-distended  Ext: no edema  Neuro: alert and attentive    T(C): 36.9 (05-04-22 @ 09:06), Max: 37 (05-04-22 @ 00:17)  HR: 94 (05-04-22 @ 09:06) (68 - 118)  BP: 134/68 (05-04-22 @ 09:06) (102/50 - 155/91)  RR: 18 (05-04-22 @ 09:06) (15 - 18)  SpO2: 95% (05-04-22 @ 09:06) (95% - 100%)  Wt(kg): --    I&O's Summary    03 May 2022 07:01  -  04 May 2022 07:00  --------------------------------------------------------  IN: 510 mL / OUT: 0 mL / NET: 510 mL    04 May 2022 07:01  -  04 May 2022 10:17  --------------------------------------------------------  IN: 255 mL / OUT: 0 mL / NET: 255 mL        TELEMETRY: 	      ECG:  	  	  LABS:                        12.3   9.34  )-----------( 178      ( 04 May 2022 07:54 )             38.0     05-04    138  |  104  |  7   ----------------------------<  98  3.4<L>   |  24  |  0.73    Ca    8.6      04 May 2022 07:54  Phos  2.3     05-04  Mg     2.0     05-04    TPro  6.5  /  Alb  3.6  /  TBili  0.7  /  DBili  x   /  AST  181<H>  /  ALT  225<H>  /  AlkPhos  123<H>  05-04      Lipid Profile:   HgA1c:   TSH:     CARDIAC MARKERS:          proBNP:     RADIOLOGY:         HPI:  88 yo F PMH of HTN, HLD, cardiac stents X2 ( placed7 years ago - her cardiologist Dr. Kale Colin) who presents with abdominal pain, found to have acute cholecystitis with plan for lap luis. Cardiology consulted for pre-op. Patient doing well, no chest pain, sob or palpitations.     03 May 2022 23:54)    PAST MEDICAL & SURGICAL HISTORY:  Hyperlipidemia    Hypertension    H/O heart artery stent  X2      PREVIOUS DIAGNOSTIC TESTING:    	    FAMILY HISTORY:      Social History:  Never smoker, no Etoh (03 May 2022 23:54)      ALLERGIES/INTOLERANCES:  No Known Allergies    HOME MEDICATIONS:    INPATIENT MEDICATIONS:  amLODIPine   Tablet 10 milliGRAM(s) Oral daily    heparin   Injectable 5000 Unit(s) SubCutaneous every 8 hours    acetaminophen     Tablet .. 1000 milliGRAM(s) Oral every 6 hours PRN  atorvastatin 20 milliGRAM(s) Oral at bedtime  lactated ringers. 1000 milliLiter(s) IV Continuous <Continuous>  levothyroxine 50 MICROGram(s) Oral daily  metroNIDAZOLE  IVPB 500 milliGRAM(s) IV Intermittent every 8 hours  ondansetron Injectable 4 milliGRAM(s) IV Push every 6 hours PRN  potassium phosphate IVPB 30 milliMole(s) IV Intermittent once      REVIEW OF SYSTEMS: See HPI     PHYSICAL EXAM:  Gen: NAD   HEENT: EOMI   Neck: Flat JVP   Cor: Normal s1, s2. RRR.   Abd: soft, non-tender, non-distended  Ext: no edema  Neuro: alert and attentive    T(C): 36.9 (22 @ 09:06), Max: 37 (22 @ 00:17)  HR: 94 (22 @ 09:06) (68 - 118)  BP: 134/68 (22 @ 09:06) (102/50 - 155/91)  RR: 18 (22 @ 09:06) (15 - 18)  SpO2: 95% (22 @ 09:06) (95% - 100%)  Wt(kg): --    I&O's Summary    03 May 2022 07:01  -  04 May 2022 07:00  --------------------------------------------------------  IN: 510 mL / OUT: 0 mL / NET: 510 mL    04 May 2022 07:01  -  04 May 2022 10:17  --------------------------------------------------------  IN: 255 mL / OUT: 0 mL / NET: 255 mL        TELEMETRY: 	  sinus     EC/15/22 - NSR without ischemic changes	  	  LABS:                        12.3   9.34  )-----------( 178      ( 04 May 2022 07:54 )             38.0     05-04    138  |  104  |  7   ----------------------------<  98  3.4<L>   |  24  |  0.73    Ca    8.6      04 May 2022 07:54  Phos  2.3     05-04  Mg     2.0     05-04    TPro  6.5  /  Alb  3.6  /  TBili  0.7  /  DBili  x   /  AST  181<H>  /  ALT  225<H>  /  AlkPhos  123<H>  05-04

## 2022-05-04 NOTE — CONSULT NOTE ADULT - SUBJECTIVE AND OBJECTIVE BOX
CC: abdominal pain    HPI:  Per admission H&P:  "86 yo F PMH of HTN, HLD, cardiac stents X2 were put 7 years ago (her cardiologist Dr. Kale perkins advised her that she does not need to be on ASA or plavix anymore). she presented to the ED today for 24 hrs hx of abd pain, emesis X2 NBNB, and black colored diarrhea (she indicated that her stool is black at baseline). She denied hx of gallstones. "    Patient admitted to surgical service and made NPO with concern for cholecystitis, awaiting MRCP. Seen by me today. She confirms above history. At the moment her abdominal pain is well controlled, though still with pain with deep palpation. Denies nausea or vomiting today. No SOB, chest pain, dizziness, lightheadedness. She says she can walk many city blocks without issues (greater than 10) and does all her own housework; she can climb a flight of stairs albeit slowly due to arthritis. No chest pain with exertion.    12 point ROS reviewed and negative except as otherwise stated in HPI and below    PAST MEDICAL & SURGICAL HISTORY:  Hyperlipidemia    Hypertension    H/O heart artery stent  X2      SOCIAL HISTORY:  Tobacco: denies  Alcohol: denies  Illicit Drugs: denies  Living situation: alone  ADLs: independent    FAMILY HISTORY:  Mother and brother with heart disease    ALLERGIES:  No Known Allergies      HOME MEDICATIONS:  amLODIPine 10 mg oral tablet: 1 tab(s) orally once a day  atorvastatin 20 mg oral tablet: 1 tab(s) orally once a day  levothyroxine 50 mcg (0.05 mg) oral tablet: 1 tab(s) orally once a day  PriLOSEC 10 mg oral delayed release capsule: 1 cap(s) orally once a day      Vital Signs Last 24 Hrs  T(F): 98.4 (04 May 2022 09:06), Max: 98.6 (04 May 2022 00:17)  HR: 94 (04 May 2022 09:06) (68 - 118)  BP: 134/68 (04 May 2022 09:06) (102/50 - 155/91)  RR: 18 (04 May 2022 09:06) (15 - 18)  SpO2: 95% (04 May 2022 09:06) (95% - 100%)    PHYSICAL EXAM:  GENERAL: pleasant, appropriate, no acute distress  HEAD:  Atraumatic, Normocephalic  EYES: EOMI, PERRLA, conjunctiva and sclera clear  ENMT: No tonsillar erythema, exudates, or enlargement; Moist mucous membranes  NECK: Supple, No JVD  CHEST/LUNG: Clear to auscultation bilaterally; No rales, rhonchi, wheezing, or rubs  HEART: Regular rate and rhythm; S1/S2, No murmurs, rubs, or gallops  ABDOMEN: Soft, mildly tender with deep palpation, though negative Monterroso's sign. Nondistended; Bowel sounds present  VASCULAR: Normal pulses, Normal capillary refill  EXTREMITIES:  2+ Peripheral Pulses, No cyanosis, No edema  LYMPH: No lymphadenopathy noted  SKIN: Warm, Intact  PSYCH: Normal mood and affect  NERVOUS SYSTEM:  A/O x3, CN 2-12 intact, No focal deficits    LABS:                        12.3   9.34  )-----------( 178      ( 04 May 2022 07:54 )             38.0     05-04    138  |  104  |  7   ----------------------------<  98  3.4   |  24  |  0.73    Ca    8.6      04 May 2022 07:54  Phos  2.3     05-04  Mg     2.0     05-04    TPro  6.5  /  Alb  3.6  /  TBili  0.7  /  DBili  x   /  AST  181  /  ALT  225  /  AlkPhos  123  05-04      Urinalysis Basic - ( 03 May 2022 19:13 )    Color: Yellow / Appearance: Clear / S.010 / pH: x  Gluc: x / Ketone: 15 mg/dL  / Bili: Negative / Urobili: 0.2 E.U./dL   Blood: x / Protein: NEGATIVE mg/dL / Nitrite: NEGATIVE   Leuk Esterase: Small / RBC: < 5 /HPF / WBC 5-10 /HPF   Sq Epi: x / Non Sq Epi: 0-5 /HPF / Bacteria: Present /HPF    Culture - Urine (collected 03 May 2022 20:49)  Source: Clean Catch Clean Catch (Midstream)  Preliminary Report (04 May 2022 09:11):    No growth to date.      COVID-19 PCR: Negative (22 @ 16:30)      RADIOLOGY & ADDITIONAL TESTS:  < from: CT Abdomen and Pelvis w/ Oral Cont and w/ IV Cont (22 @ 21:01) >  IMPRESSION:  Gallbladder wall thickening which may represent acute cholecystitis in   the appropriate clinical context.    < end of copied text >  < from: US Abdomen Limited (22 @ 18:00) >  FINDINGS:    Liver: Normal echogenicity with no visible focal abnormality. Liver size   is normal, measuring 12.8 cm in craniocaudal dimension. Hepatopetal flow   is noted in the main portal vein.    Intrahepatic ducts: Not dilated.    Common bile duct: Normal diameter, measuring 6 cm.    Gallbladder: No visible gallstones. No wall thickening or pericholecystic   fluid.    Pancreas: The visualized portions were normal in appearance.    Abdominal aorta/Inferior vena cava: The visualized portions are   unremarkable.    Right kidney: Normal echogenicity. No focal lesions. Length of 8.6 cm.    Ascites: None      IMPRESSION:  No acute abnormality.    < end of copied text >

## 2022-05-04 NOTE — CONSULT NOTE ADULT - ASSESSMENT
88 yo F PMH of HTN, HLD, cardiac stents X2 ( placed7 years ago - her cardiologist Dr. Kale Colin) who presents with abdominal pain, found to have acute cholecystitis with plan for lap luis. Cardiology consulted for pre-op.     #Pre-op  Patient with CAD s/p stents not on ASA or plavix. METS > 4.0   - patient is a high risk patient for an intermediate risk procedure  - pending TTE  - unclear why patient is not on ASA or plavix; would resume ASA 81 daily after the surgery when safe from bleeding perspective   - patient is optimized for lap luis or other abdominal surgical procedures; does not need any further cardiac testings      88 yo F PMH of HTN, HLD, cardiac stents X2 ( placed7 years ago - her cardiologist Dr. Kale Colin) who presents with abdominal pain, found to have acute cholecystitis with plan for lap luis. Cardiology consulted for pre-op.     #Pre-op  Patient with CAD s/p stents not on ASA or plavix. METS > 4.0   - patient is a intermediate risk patient for an intermediate risk procedure  - pending TTE  - unclear why patient is not on ASA or plavix; would resume ASA 81 daily after the surgery when safe from bleeding perspective   - patient is optimized for lap luis or other abdominal surgical procedures; does not need any further cardiac testings

## 2022-05-04 NOTE — BRIEF OPERATIVE NOTE - OPERATION/FINDINGS
Nichole access to abdomen. Femoral hernia right side. Reduced robotically. All bowel viable, also checked with ICG. One area of superficial serosal injury oversewed with vicryl. Preperitoneal space developed, Hernia sac reduced, progrip mesh placed. Peritoneum closed with quill stitch. Abdomen irrigated. Ports removed under visualization after exparel administered. All hemostatic. Umbilical port site fascia closed with maxxon. Skin closed with monocryl and dermabond.

## 2022-05-04 NOTE — PROGRESS NOTE ADULT - ATTENDING COMMENTS
Pt was seen and examined.  Abdomen is soft, completely non-tender.  No Monterroso sign.  Afebrile.  Labs and radiology studies are noted.  LFTs elevation patter and radiologic picture favor more GB edema due to CHF.  Will evaluate with HIDA scan.

## 2022-05-04 NOTE — BRIEF OPERATIVE NOTE - NSICDXBRIEFPREOP_GEN_ALL_CORE_FT
PRE-OP DIAGNOSIS:  Femoral hernia of right side 04-May-2022 15:26:10  Zahraa Conrad  Small bowel obstruction 04-May-2022 15:26:18  Zahraa Conrad

## 2022-05-04 NOTE — PROGRESS NOTE ADULT - SUBJECTIVE AND OBJECTIVE BOX
SUBJECTIVE: Pt seen and examined by chief resident. Pt is doing well, resting comfortably on bed. Denies abdominal pain. Reporting headache overnight.     Vital Signs Last 24 Hrs  T(C): 36.7 (04 May 2022 05:22), Max: 37 (04 May 2022 00:17)  T(F): 98.1 (04 May 2022 05:22), Max: 98.6 (04 May 2022 00:17)  HR: 76 (04 May 2022 05:22) (68 - 118)  BP: 102/50 (04 May 2022 05:22) (102/50 - 155/91)  BP(mean): --  RR: 16 (04 May 2022 05:22) (15 - 18)  SpO2: 96% (04 May 2022 05:22) (95% - 100%)    I&O's Summary    03 May 2022 07:01  -  04 May 2022 07:00  --------------------------------------------------------  IN: 255 mL / OUT: 0 mL / NET: 255 mL    Physical Exam:  General Appearance: Appears well, NAD  Pulmonary: Nonlabored breathing, no respiratory distress  Cardiovascular: NSR  Abdomen: Soft, mild RUQ tenderness, nondistended, no rebound or guarding  Extremities: WWP, SCD's in place     LABS:                        12.6   12.73 )-----------( 178      ( 03 May 2022 16:31 )             39.1         137  |  102  |  11  ----------------------------<  136<H>  3.7   |  23  |  0.65    Ca    8.9      03 May 2022 16:31    TPro  6.9  /  Alb  3.9  /  TBili  1.2  /  DBili  x   /  AST  453<H>  /  ALT  324<H>  /  AlkPhos  136<H>  05-03      Urinalysis Basic - ( 03 May 2022 19:13 )    Color: Yellow / Appearance: Clear / S.010 / pH: x  Gluc: x / Ketone: 15 mg/dL  / Bili: Negative / Urobili: 0.2 E.U./dL   Blood: x / Protein: NEGATIVE mg/dL / Nitrite: NEGATIVE   Leuk Esterase: Small / RBC: < 5 /HPF / WBC 5-10 /HPF   Sq Epi: x / Non Sq Epi: 0-5 /HPF / Bacteria: Present /HPF

## 2022-05-04 NOTE — CONSULT NOTE ADULT - ATTENDING COMMENTS
Initial attending contact date 5/4/22     . See fellow note written above for details. I reviewed the fellow documentation. I have personally seen and examined this patient. I reviewed vitals, labs, medications, cardiac studies, and additional imaging. I agree with the above fellow's findings and plans as written above with the following additions/statements.    86 yo F PMH of HTN, HLD, cardiac stents X 2 ( placed7 years ago - her cardiologist Dr. Kale Colin) who presents with abdominal pain, found to have acute cholecystitis with plan for lap luis. Cardiology consulted for pre-op.   -pt with excellent functional capacity, able to perform > 4 METS without anginal equivalents  -EKG NSR nonischemic  -CAD status appears stable, unclear why patient is not on ASA, would start post-operatively  -No need for further cardiac work up  -Pt considered intermediate risk for intermediate risk procedure   -Will fu post op

## 2022-05-04 NOTE — BRIEF OPERATIVE NOTE - NSICDXBRIEFPOSTOP_GEN_ALL_CORE_FT
POST-OP DIAGNOSIS:  Femoral hernia of right side 04-May-2022 15:26:33  Zahraa Conrad  Small bowel obstruction 04-May-2022 15:26:45  Zahraa Conrad

## 2022-05-04 NOTE — PATIENT PROFILE ADULT - FALL HARM RISK - HARM RISK INTERVENTIONS
Assistance with ambulation/Communicate Risk of Fall with Harm to all staff/Monitor for mental status changes/Monitor gait and stability/Reinforce activity limits and safety measures with patient and family/Sit up slowly, dangle for a short time, stand at bedside before walking/Tailored Fall Risk Interventions/Use of alarms - bed, chair and/or voice tab/Visual Cue: Yellow wristband and red socks/Bed in lowest position, wheels locked, appropriate side rails in place/Call bell, personal items and telephone in reach/Instruct patient to call for assistance before getting out of bed or chair/Non-slip footwear when patient is out of bed/[place] to call system/Physically safe environment - no spills, clutter or unnecessary equipment/Purposeful Proactive Rounding/Room/bathroom lighting operational, light cord in reach

## 2022-05-04 NOTE — CONSULT NOTE ADULT - ASSESSMENT
87YOM with history of essential HTN, hypothyroidism, hyperlipidemia admitted to surgical service with concern for acute cholecystitis.    Problem list/Assessment:  Acute cholecystitis - based on imaging and symptoms, with elevated LFTs. Possibly for lap luis  Preoperative cardiac risk stratification for noncardiac surgery - low risk for intermediate risk surgery; see below  Essential HTN - home meds amlodipine 10mg once daily  Hyperlipidemia - home med atorvastatin 20mg once daily  Hypothyroidism - home med levothyroxine 50mcg once daily    Preoperative risk assessment  Surgery: lap luis  Cardiac risk calculators: RCRI: 0-1 (Class 1-2 risk of MACE); Koehler 0.2% 30 day risk of MACE; NSQIP average to above average risk for any complication including 0.2% risk for MACE  Pulmonary risk calculator: ARISCAT low risk for pulmonary complication  Functional status/METs: greater than 4  Medications: no changes needed  EKG: personally reviewed; NSR; narrow QRS, normal intervals, ?Q wave in III but not in contiguous leads, no ST elevation/depression/TWI    Recommendations:  -patient is low risk for intermediate risk surgery; may proceed to OR without need for additional testing from my end, however cardiology consult noted and defer to them if they feel any additional testing needed  -continue home meds periop  -rest of management of per surgery

## 2022-05-05 ENCOUNTER — TRANSCRIPTION ENCOUNTER (OUTPATIENT)
Age: 87
End: 2022-05-05

## 2022-05-05 VITALS
RESPIRATION RATE: 17 BRPM | TEMPERATURE: 98 F | HEART RATE: 88 BPM | SYSTOLIC BLOOD PRESSURE: 114 MMHG | DIASTOLIC BLOOD PRESSURE: 55 MMHG

## 2022-05-05 LAB
ALBUMIN SERPL ELPH-MCNC: 3.1 G/DL — LOW (ref 3.3–5)
ALP SERPL-CCNC: 107 U/L — SIGNIFICANT CHANGE UP (ref 40–120)
ALT FLD-CCNC: 139 U/L — HIGH (ref 10–45)
ANION GAP SERPL CALC-SCNC: 9 MMOL/L — SIGNIFICANT CHANGE UP (ref 5–17)
APTT BLD: 34.8 SEC — SIGNIFICANT CHANGE UP (ref 27.5–35.5)
AST SERPL-CCNC: 75 U/L — HIGH (ref 10–40)
BILIRUB SERPL-MCNC: 0.5 MG/DL — SIGNIFICANT CHANGE UP (ref 0.2–1.2)
BLD GP AB SCN SERPL QL: NEGATIVE — SIGNIFICANT CHANGE UP
BUN SERPL-MCNC: 6 MG/DL — LOW (ref 7–23)
CALCIUM SERPL-MCNC: 8.2 MG/DL — LOW (ref 8.4–10.5)
CHLORIDE SERPL-SCNC: 104 MMOL/L — SIGNIFICANT CHANGE UP (ref 96–108)
CO2 SERPL-SCNC: 24 MMOL/L — SIGNIFICANT CHANGE UP (ref 22–31)
CREAT SERPL-MCNC: 0.75 MG/DL — SIGNIFICANT CHANGE UP (ref 0.5–1.3)
CULTURE RESULTS: SIGNIFICANT CHANGE UP
EGFR: 77 ML/MIN/1.73M2 — SIGNIFICANT CHANGE UP
GLUCOSE SERPL-MCNC: 90 MG/DL — SIGNIFICANT CHANGE UP (ref 70–99)
HCT VFR BLD CALC: 35.8 % — SIGNIFICANT CHANGE UP (ref 34.5–45)
HGB BLD-MCNC: 11.7 G/DL — SIGNIFICANT CHANGE UP (ref 11.5–15.5)
INR BLD: 1.1 — SIGNIFICANT CHANGE UP (ref 0.88–1.16)
MAGNESIUM SERPL-MCNC: 1.9 MG/DL — SIGNIFICANT CHANGE UP (ref 1.6–2.6)
MCHC RBC-ENTMCNC: 27.8 PG — SIGNIFICANT CHANGE UP (ref 27–34)
MCHC RBC-ENTMCNC: 32.7 GM/DL — SIGNIFICANT CHANGE UP (ref 32–36)
MCV RBC AUTO: 85 FL — SIGNIFICANT CHANGE UP (ref 80–100)
NRBC # BLD: 0 /100 WBCS — SIGNIFICANT CHANGE UP (ref 0–0)
PHOSPHATE SERPL-MCNC: 3.3 MG/DL — SIGNIFICANT CHANGE UP (ref 2.5–4.5)
PLATELET # BLD AUTO: 159 K/UL — SIGNIFICANT CHANGE UP (ref 150–400)
POTASSIUM SERPL-MCNC: 3.2 MMOL/L — LOW (ref 3.5–5.3)
POTASSIUM SERPL-SCNC: 3.2 MMOL/L — LOW (ref 3.5–5.3)
PROT SERPL-MCNC: 6 G/DL — SIGNIFICANT CHANGE UP (ref 6–8.3)
PROTHROM AB SERPL-ACNC: 13.1 SEC — SIGNIFICANT CHANGE UP (ref 10.5–13.4)
RBC # BLD: 4.21 M/UL — SIGNIFICANT CHANGE UP (ref 3.8–5.2)
RBC # FLD: 12.9 % — SIGNIFICANT CHANGE UP (ref 10.3–14.5)
RH IG SCN BLD-IMP: POSITIVE — SIGNIFICANT CHANGE UP
SODIUM SERPL-SCNC: 137 MMOL/L — SIGNIFICANT CHANGE UP (ref 135–145)
SPECIMEN SOURCE: SIGNIFICANT CHANGE UP
WBC # BLD: 6.98 K/UL — SIGNIFICANT CHANGE UP (ref 3.8–10.5)
WBC # FLD AUTO: 6.98 K/UL — SIGNIFICANT CHANGE UP (ref 3.8–10.5)

## 2022-05-05 PROCEDURE — 83690 ASSAY OF LIPASE: CPT

## 2022-05-05 PROCEDURE — 99285 EMERGENCY DEPT VISIT HI MDM: CPT

## 2022-05-05 PROCEDURE — 84100 ASSAY OF PHOSPHORUS: CPT

## 2022-05-05 PROCEDURE — 36415 COLL VENOUS BLD VENIPUNCTURE: CPT

## 2022-05-05 PROCEDURE — 86850 RBC ANTIBODY SCREEN: CPT

## 2022-05-05 PROCEDURE — 87635 SARS-COV-2 COVID-19 AMP PRB: CPT

## 2022-05-05 PROCEDURE — 85027 COMPLETE CBC AUTOMATED: CPT

## 2022-05-05 PROCEDURE — 85730 THROMBOPLASTIN TIME PARTIAL: CPT

## 2022-05-05 PROCEDURE — 85610 PROTHROMBIN TIME: CPT

## 2022-05-05 PROCEDURE — 83735 ASSAY OF MAGNESIUM: CPT

## 2022-05-05 PROCEDURE — 74177 CT ABD & PELVIS W/CONTRAST: CPT | Mod: MA

## 2022-05-05 PROCEDURE — 86900 BLOOD TYPING SEROLOGIC ABO: CPT

## 2022-05-05 PROCEDURE — 85025 COMPLETE CBC W/AUTO DIFF WBC: CPT

## 2022-05-05 PROCEDURE — 86901 BLOOD TYPING SEROLOGIC RH(D): CPT

## 2022-05-05 PROCEDURE — 76705 ECHO EXAM OF ABDOMEN: CPT

## 2022-05-05 PROCEDURE — 93306 TTE W/DOPPLER COMPLETE: CPT

## 2022-05-05 PROCEDURE — 83605 ASSAY OF LACTIC ACID: CPT

## 2022-05-05 PROCEDURE — 87086 URINE CULTURE/COLONY COUNT: CPT

## 2022-05-05 PROCEDURE — 80053 COMPREHEN METABOLIC PANEL: CPT

## 2022-05-05 PROCEDURE — A9537: CPT

## 2022-05-05 PROCEDURE — 78226 HEPATOBILIARY SYSTEM IMAGING: CPT

## 2022-05-05 PROCEDURE — 76376 3D RENDER W/INTRP POSTPROCES: CPT | Mod: 26

## 2022-05-05 PROCEDURE — 81001 URINALYSIS AUTO W/SCOPE: CPT

## 2022-05-05 PROCEDURE — 93306 TTE W/DOPPLER COMPLETE: CPT | Mod: 26

## 2022-05-05 RX ORDER — POTASSIUM CHLORIDE 20 MEQ
40 PACKET (EA) ORAL EVERY 4 HOURS
Refills: 0 | Status: COMPLETED | OUTPATIENT
Start: 2022-05-05 | End: 2022-05-05

## 2022-05-05 RX ORDER — ALBUTEROL 90 UG/1
1 AEROSOL, METERED ORAL
Qty: 0 | Refills: 0 | DISCHARGE

## 2022-05-05 RX ORDER — MAGNESIUM SULFATE 500 MG/ML
1 VIAL (ML) INJECTION ONCE
Refills: 0 | Status: COMPLETED | OUTPATIENT
Start: 2022-05-05 | End: 2022-05-05

## 2022-05-05 RX ORDER — ALBUTEROL 90 UG/1
0 AEROSOL, METERED ORAL
Qty: 0 | Refills: 0 | DISCHARGE

## 2022-05-05 RX ORDER — ASPIRIN/CALCIUM CARB/MAGNESIUM 324 MG
1 TABLET ORAL
Qty: 30 | Refills: 0
Start: 2022-05-05 | End: 2022-06-03

## 2022-05-05 RX ORDER — ASPIRIN/CALCIUM CARB/MAGNESIUM 324 MG
81 TABLET ORAL DAILY
Refills: 0 | Status: DISCONTINUED | OUTPATIENT
Start: 2022-05-05 | End: 2022-05-05

## 2022-05-05 RX ADMIN — Medication 40 MILLIEQUIVALENT(S): at 13:54

## 2022-05-05 RX ADMIN — AMLODIPINE BESYLATE 10 MILLIGRAM(S): 2.5 TABLET ORAL at 05:57

## 2022-05-05 RX ADMIN — HEPARIN SODIUM 5000 UNIT(S): 5000 INJECTION INTRAVENOUS; SUBCUTANEOUS at 05:57

## 2022-05-05 RX ADMIN — Medication 40 MILLIEQUIVALENT(S): at 09:05

## 2022-05-05 RX ADMIN — Medication 50 MICROGRAM(S): at 05:57

## 2022-05-05 RX ADMIN — Medication 100 MILLIGRAM(S): at 08:01

## 2022-05-05 RX ADMIN — Medication 100 MILLIGRAM(S): at 01:32

## 2022-05-05 RX ADMIN — HEPARIN SODIUM 5000 UNIT(S): 5000 INJECTION INTRAVENOUS; SUBCUTANEOUS at 13:55

## 2022-05-05 RX ADMIN — PANTOPRAZOLE SODIUM 40 MILLIGRAM(S): 20 TABLET, DELAYED RELEASE ORAL at 06:39

## 2022-05-05 RX ADMIN — CEFTRIAXONE 100 MILLIGRAM(S): 500 INJECTION, POWDER, FOR SOLUTION INTRAMUSCULAR; INTRAVENOUS at 00:24

## 2022-05-05 RX ADMIN — Medication 81 MILLIGRAM(S): at 09:06

## 2022-05-05 RX ADMIN — SODIUM CHLORIDE 40 MILLILITER(S): 9 INJECTION, SOLUTION INTRAVENOUS at 04:11

## 2022-05-05 RX ADMIN — Medication 100 GRAM(S): at 09:06

## 2022-05-05 NOTE — DISCHARGE NOTE PROVIDER - NSDCFUADDINST_GEN_ALL_CORE_FT
Cardiology Follow Up:  Please follow up with your outpatient cardiologist, next week. It is very important that you attend your appointment on 5/11/22 to discuss resumption of Aspirin 81mg and further management of your cardiac stents.  Please continue to take Aspirin 81mg every day.    Acute Care Surgery Clinic:  You may follow up with the acute care surgery clinic as needed.   [address], [place] [address]  Phone: [phone]  Fax: [phone]      General Discharge Instructions:  Please resume all regular home medications unless specifically advised not to take a particular medication. Also, please take any new medications as prescribed.  Please get plenty of rest, continue to ambulate several times per day, and drink adequate amounts of fluids.   Please follow-up with your cardiologist and Primary Care Provider (PCP) in 1-2 weeks.    Warning Signs:  Please call your doctor if you experience the following:  *You experience new chest pain, pressure, squeezing or tightness.  *New or worsening cough, shortness of breath, or wheeze.  *If you are vomiting and cannot keep down fluids or your medications.  *You are getting dehydrated due to continued vomiting, diarrhea, or other reasons. Signs of dehydration include dry mouth, rapid heartbeat, or feeling dizzy or faint when standing.  *You see blood or dark/black material when you vomit or have a bowel movement.  *You experience burning when you urinate, have blood in your urine, or experience a discharge.  *Your pain is not improving within 8-12 hours or is not gone within 24 hours. Call or return immediately if your pain is getting worse, changes location, or moves to your chest or back.  *You have shaking chills, or fever greater than 101.5 degrees Fahrenheit or 38 degrees Celsius.  *Any change in your symptoms, or any new symptoms that concern you.

## 2022-05-05 NOTE — DISCHARGE NOTE PROVIDER - HOSPITAL COURSE
86 yo F PMH of cardiac stents, HLD, HTN w/ abd pain and emesis, CT showed GB wall thickening and one gallstone. Abd US showed normal GB. Pt was admitted for concern for acute luis. On 5/4 pt underwnet HIDA which was negative. Pt's status improved clincally with hydration, pain resolved, diet was advanced and tolerated. Pt was seen by cardiology for hx of cardiac stents and rec'd pt to restart ASA 81 and follow up with outpt cardiologist. The rest of the pts course was unremarkable and on day of discharge pt was stable for dc home.

## 2022-05-05 NOTE — DISCHARGE NOTE PROVIDER - NSDCCPCAREPLAN_GEN_ALL_CORE_FT
PRINCIPAL DISCHARGE DIAGNOSIS  Diagnosis: Right sided abdominal pain  Assessment and Plan of Treatment:

## 2022-05-05 NOTE — PROGRESS NOTE ADULT - SUBJECTIVE AND OBJECTIVE BOX
SUBJECTIVE: Pt seen and examined by chief resident. Pt is doing well, resting comfortably on bed. Feeling better. Denies abdominal pain. No nausea or vomiting. No complaints at this time.    Vital Signs Last 24 Hrs  T(C): 36.6 (05 May 2022 05:30), Max: 36.9 (04 May 2022 09:06)  T(F): 97.9 (05 May 2022 05:30), Max: 98.4 (04 May 2022 09:06)  HR: 80 (05 May 2022 05:30) (80 - 99)  BP: 112/59 (05 May 2022 05:30) (109/59 - 134/68)  BP(mean): --  RR: 18 (05 May 2022 05:30) (17 - 20)  SpO2: 95% (05 May 2022 05:30) (95% - 96%)    I&O's Summary    04 May 2022 07:01  -  05 May 2022 07:00  --------------------------------------------------------  IN: 1768.2 mL / OUT: 1500 mL / NET: 268.2 mL    Physical Exam:  General Appearance: Appears well, NAD  Pulmonary: Nonlabored breathing, no respiratory distress  Abdomen: Soft, nondisteded, nontender  Extremities: WWP, SCD's in place     LABS:                        11.7   6.98  )-----------( 159      ( 05 May 2022 05:29 )             35.8     05-    137  |  104  |  6<L>  ----------------------------<  90  3.2<L>   |  24  |  0.75    Ca    8.2<L>      05 May 2022 05:29  Phos  3.3     05-05  Mg     1.9     05-05    TPro  6.0  /  Alb  3.1<L>  /  TBili  0.5  /  DBili  x   /  AST  75<H>  /  ALT  139<H>  /  AlkPhos  107  05-05    PT/INR - ( 05 May 2022 05:29 )   PT: 13.1 sec;   INR: 1.10          PTT - ( 05 May 2022 05:29 )  PTT:34.8 sec  Urinalysis Basic - ( 03 May 2022 19:13 )    Color: Yellow / Appearance: Clear / S.010 / pH: x  Gluc: x / Ketone: 15 mg/dL  / Bili: Negative / Urobili: 0.2 E.U./dL   Blood: x / Protein: NEGATIVE mg/dL / Nitrite: NEGATIVE   Leuk Esterase: Small / RBC: < 5 /HPF / WBC 5-10 /HPF   Sq Epi: x / Non Sq Epi: 0-5 /HPF / Bacteria: Present /HPF

## 2022-05-05 NOTE — DISCHARGE NOTE PROVIDER - NSDCMRMEDTOKEN_GEN_ALL_CORE_FT
Albuterol (Eqv-ProAir HFA) 90 mcg/inh inhalation aerosol: 1  inhaled 2 times a day, As Needed  amLODIPine 10 mg oral tablet: 1 tab(s) orally once a day  aspirin 81 mg oral tablet, chewable: 1 tab(s) orally once a day  atorvastatin 20 mg oral tablet: 1 tab(s) orally once a day  levothyroxine 50 mcg (0.05 mg) oral tablet: 1 tab(s) orally once a day  omeprazole 20 mg oral delayed release capsule: 1 cap(s) orally once a day

## 2022-05-05 NOTE — PROGRESS NOTE ADULT - ASSESSMENT
88 yo F PMH of cardiac stents, HLD, HTN w/ abd pain and emesis, CT showed GB wall thickening and one gallstone. Abd US showed normal GB. HIDA neg.     Reg  Cef/flagyl  Pain/nausea control  SCD/OOBA/HSQ  Home meds  AM labs

## 2022-05-05 NOTE — DISCHARGE NOTE PROVIDER - CARE PROVIDER_API CALL
[facility],   [address] [place] [address], [place] [address]  Phone: [phone]  Fax: [phone]  Follow Up Time:

## 2022-05-05 NOTE — DISCHARGE NOTE NURSING/CASE MANAGEMENT/SOCIAL WORK - NSDCPEFALRISK_GEN_ALL_CORE
For information on Fall & Injury Prevention, visit: [email or web address] OR  [email or web address] OR  https://www.cdc.gov/steadi/patient.html

## 2022-05-05 NOTE — DISCHARGE NOTE NURSING/CASE MANAGEMENT/SOCIAL WORK - PATIENT PORTAL LINK FT
You can access the FollowMyHealth Patient Portal offered by [facility] by registering at the following website: [email or web address]. By joining [employer]’s FollowMyHealth portal, you will also be able to view your health information using other applications (apps) compatible with our system.

## 2022-05-11 DIAGNOSIS — Z95.5 PRESENCE OF CORONARY ANGIOPLASTY IMPLANT AND GRAFT: ICD-10-CM

## 2022-05-11 DIAGNOSIS — R11.10 VOMITING, UNSPECIFIED: ICD-10-CM

## 2022-05-11 DIAGNOSIS — E03.9 HYPOTHYROIDISM, UNSPECIFIED: ICD-10-CM

## 2022-05-11 DIAGNOSIS — M19.90 UNSPECIFIED OSTEOARTHRITIS, UNSPECIFIED SITE: ICD-10-CM

## 2022-05-11 DIAGNOSIS — K81.0 ACUTE CHOLECYSTITIS: ICD-10-CM

## 2022-05-11 DIAGNOSIS — E78.5 HYPERLIPIDEMIA, UNSPECIFIED: ICD-10-CM

## 2022-05-11 DIAGNOSIS — I10 ESSENTIAL (PRIMARY) HYPERTENSION: ICD-10-CM

## 2022-05-11 DIAGNOSIS — E83.42 HYPOMAGNESEMIA: ICD-10-CM

## 2023-07-31 NOTE — ED ADULT NURSE NOTE - NS PRO PASSIVE SMOKE EXP
Encounter Date: 7/31/2023    SCRIBE #1 NOTE: I, Josef Brunson, am scribing for, and in the presence of,  Jarad Alan II, MD.       History     Chief Complaint   Patient presents with    Shortness of Breath    Leg Swelling     Pt with hx of CHF c/o increased SOB and LLE edema since Thursday last week. LLE 2+ pitting edema, normal color. Pt on 2L NC at baseline. Speaking in clear full sentences, no acute distress at triage.      Time seen by provider: 11:53 AM    Inna Blankenship is a 89 y.o. female, with a PMHx of CHF, who presents to the ED with SOB and fluid buildup since Wednesday. Per grandson, patient has had worsening breathing difficulty and bilateral leg swelling prompting visit to the ED. He also reports concerns with patient's kidney function. On exam, patient reports feeling better and denies cough or fevers. Grandson states patient has been taking her meds. Patient denies smoking. This is the extent of the patient's complaints today in the Emergency Department.      The history is provided by the patient and a relative.     Review of patient's allergies indicates:  No Known Allergies  Past Medical History:   Diagnosis Date    Acute hypoxemic respiratory failure 1/30/2023    Arthritis     CHF (congestive heart failure) 12/26/2018    Chronic atrial fibrillation 8/29/2020    Chronic kidney disease, stage III (moderate) 9/11/2015    Colon adenoma     Diabetes mellitus, type II     Diet controlled    Glaucoma     Hyperlipemia     Hypertension     Osteopenia      Past Surgical History:   Procedure Laterality Date    APPENDECTOMY      CATARACT EXTRACTION W/  INTRAOCULAR LENS IMPLANT Right 03/15/2006        CATARACT EXTRACTION W/  INTRAOCULAR LENS IMPLANT Left 09/27/2006        COLONOSCOPY W/ POLYPECTOMY      EYE SURGERY      HYSTERECTOMY      TRANSESOPHAGEAL ECHOCARDIOGRAPHY N/A 11/9/2020    Procedure: ECHOCARDIOGRAM, TRANSESOPHAGEAL;  Surgeon: Tinoc Diagnostic Provider;  Location: [facility]  LAB;  Service: Cardiology;  Laterality: N/A;    TREATMENT OF CARDIAC ARRHYTHMIA N/A 11/9/2020    Procedure: CARDIOVERSION;  Surgeon: Marvin Elmoer MD;  Location: [facility] LAB;  Service: Cardiology;  Laterality: N/A;  AF, RAFFAELE, DCCV, MAC, GP, 3 PREP     Family History   Problem Relation Age of Onset    Heart attack Mother     Heart disease Mother     No Known Problems Father     Cancer Sister     Hypertension Daughter     Asthma Daughter     Allergies Daughter     Cancer Daughter         ovarian    Amblyopia Neg Hx     Blindness Neg Hx     Cataracts Neg Hx     Diabetes Neg Hx     Glaucoma Neg Hx     Macular degeneration Neg Hx     Retinal detachment Neg Hx     Strabismus Neg Hx     Stroke Neg Hx     Thyroid disease Neg Hx      Social History     Tobacco Use    Smoking status: Never    Smokeless tobacco: Never   Substance Use Topics    Alcohol use: Yes     Comment: wine occas.    Drug use: No     Review of Systems   Constitutional:  Negative for chills, diaphoresis, fatigue and fever.   HENT:  Negative for facial swelling, hearing loss and sore throat.    Respiratory:  Positive for shortness of breath. Negative for cough, chest tightness and wheezing.    Cardiovascular:  Positive for leg swelling. Negative for chest pain and palpitations.   Gastrointestinal:  Negative for abdominal distention, abdominal pain and diarrhea.   Musculoskeletal:  Negative for myalgias.   Neurological:  Negative for seizures, syncope, weakness and numbness.   Hematological:  Negative for adenopathy. Does not bruise/bleed easily.   Psychiatric/Behavioral:  Negative for confusion.        Physical Exam     Initial Vitals [07/31/23 1016]   BP Pulse Resp Temp SpO2   (!) 150/65 107 19 98 °F (36.7 °C) (!) 87 %      MAP       --         Physical Exam    Nursing note and vitals reviewed.  Constitutional: She appears well-developed and well-nourished. She is not diaphoretic. No distress.   Comfortable at rest, not dysthymic.    HENT:   Head:  Normocephalic and atraumatic.   Moist mucous membranes.   Eyes: EOM are normal. Pupils are equal, round, and reactive to light.   No pallor or icterus.      Neck: Neck supple.   Normal range of motion.  Cardiovascular:  Normal rate, regular rhythm and normal heart sounds.     Exam reveals no gallop and no friction rub.       No murmur heard.  Pulmonary/Chest: No respiratory distress. She has no wheezes. She has no rhonchi. She has rales.   Bibasilar rales. Good air exchange. No expiratory wheeze.    Abdominal: Abdomen is soft. There is no abdominal tenderness.   Musculoskeletal:         General: Edema present. No tenderness. Normal range of motion.      Cervical back: Normal range of motion and neck supple.      Comments: 1+ edema to the proximal tibilar area, left greater than right.        Lymphadenopathy:     She has no cervical adenopathy.   Neurological: She is alert and oriented to person, place, and time.   Skin: Skin is warm and dry.   Psychiatric: She has a normal mood and affect. Her behavior is normal. Judgment and thought content normal.         ED Course   Procedures  Labs Reviewed   CBC W/ AUTO DIFFERENTIAL - Abnormal; Notable for the following components:       Result Value    MCHC 31.6 (*)     RDW 15.9 (*)     Lymph # 0.9 (*)     All other components within normal limits   COMPREHENSIVE METABOLIC PANEL - Abnormal; Notable for the following components:    CO2 21 (*)     eGFR 41 (*)     All other components within normal limits   B-TYPE NATRIURETIC PEPTIDE - Abnormal; Notable for the following components:    BNP 1,171 (*)     All other components within normal limits   TROPONIN I - Abnormal; Notable for the following components:    Troponin I 0.143 (*)     All other components within normal limits   POCT GLUCOSE MONITORING CONTINUOUS     EKG Readings: (Independently Interpreted)   Initial Reading: No STEMI. Rhythm: Sinus Bradycardia. Heart Rate: 50s.   Occasional PVCs. No ST or T wave changes. No acute  ischemia.       Imaging Results              X-Ray Chest AP Portable (Final result)  Result time 07/31/23 11:08:58      Final result by Bertha Chung MD (07/31/23 11:08:58)                   Impression:      Stable enlargement of the cardiac silhouette.  Small right pleural effusion with subsegmental atelectasis.      Electronically signed by: Bertha Chung  Date:    07/31/2023  Time:    11:08               Narrative:    EXAMINATION:  XR CHEST AP PORTABLE    CLINICAL HISTORY:  Chest Pain;    TECHNIQUE:  Single frontal view of the chest was performed.    COMPARISON:  01/30/2023    FINDINGS:  Lungs are well expanded.  Small right pleural effusion with subsegmental right basilar atelectasis.  No lobar consolidation.    Cardiac silhouette is enlarged, stable.  Calcified plaque lines arch.    Degenerative change both shoulders.                                    X-Rays:   Independently Interpreted Readings:   Chest X-Ray: Cardiomegaly effusion on the right. Course interstitial markings. Effusion is new, but otherwise similar to January 30th.     Medications   furosemide injection 40 mg (has no administration in time range)   atorvastatin tablet 20 mg (has no administration in time range)   apixaban tablet 2.5 mg (has no administration in time range)   EScitalopram oxalate tablet 10 mg (has no administration in time range)   latanoprost 0.005 % ophthalmic solution 1 drop (has no administration in time range)   losartan tablet 25 mg (has no administration in time range)   furosemide injection 40 mg (has no administration in time range)   glucose chewable tablet 16 g (has no administration in time range)   glucose chewable tablet 24 g (has no administration in time range)   dextrose 50% injection 12.5 g (has no administration in time range)   dextrose 50% injection 25 g (has no administration in time range)   glucagon (human recombinant) injection 1 mg (has no administration in time range)   insulin aspart U-100 pen  0-5 Units (has no administration in time range)     Medical Decision Making:   History:   Old Medical Records: I decided to obtain old medical records.  Clinical Tests:   Lab Tests: Ordered and Reviewed  Radiological Study: Ordered and Reviewed  Medical Tests: Reviewed and Ordered    Patient presents accompanied by grandson.  He has noticed that she is been short of breath for the past few days, getting worse and also has had increased peripheral edema.  He believes she is compliant with her medication.  On exam she was 89% when she ambulated to triage.  Good O2 saturations at rest but still appears dyspneic.  EKG without ischemic changes, chest x-ray does have effusion, possible pulmonary vascular congestion.  Has elevated BNP consistent with CHF exacerbation.  Have initiated diuresis with Lasix.  Also with elevated troponin but no chest pain.  Suspect this may be secondary to the CHF, but will need admission for further diuresis, observation and serial troponins.  Case discussed with Dr. Riley of the hospitalist Service, admit to Dr. Peter Bravo Attestation:   Scribe #1: I performed the above scribed service and the documentation accurately describes the services I performed. I attest to the accuracy of the note.    Physician Attestation for Scribe: I, [facility], reviewed documentation as scribed in my presence, which is both accurate and complete.                    Clinical Impression:   Final diagnoses:  [R06.02] SOB (shortness of breath)  [I50.9] Acute on chronic congestive heart failure, unspecified heart failure type (Primary)  [R77.8] Elevated troponin        ED Disposition Condition    Observation Stable                Jarad Alan II, MD  07/31/23 5010     Unknown

## 2024-01-12 NOTE — ED ADULT NURSE NOTE - PAIN RATING/NUMBER SCALE (0-10): REST
Chief Complaint   Patient presents with    Establish Care    Annual Exam     A 54-year-old G3, P3 female patient with a past medical history of ADHD, ION, depression, migraines, lumbar and cervical radiculitis presents today to establish care    General health status: good  Diet: Balanced  Exercise: None  Associated Sx: As below  Medical encounters within last 12 months: ER Visits: 0 , Hospitalization: 0  Compliance problems: None  Additional complaint: as below  Additional Hx  Last eye exam: recently   Last dental exam: every six months  Last PAP smear: 03/2023  Last mammogram: 03/2023  Last colon cancer screening: Colonoscopy done in 12/2021 , repeat in 10 years  Last podiatric foot exam: NA  Seat belt use: routinely, all the time  Caffeine Use: Daily, tea  Tobacco use: Denies  Alcohol use: Social      ADHD  - Used to take Adderall 5 mg as needed  - Has not taken it for last few months and would like to get back on it  - Had no side effects  - Smokes marijuana regularly but no other drug use    Migraines  - Takes rizatriptan as needed about 4-5 times a year  - Very well-controlled    Multiple joint pain  - Bilateral hips, knees, hands  - Onset for last few years  - Has been checked for rheumatoid arthritis in the past but seronegative  - Continues to have arthritic pain  - Has not seen rheumatologist in the past    ROS    Rambo having any fever, chills, weakness, fatigue, visual problems, sore throat, SOB, cough, chest pain, palpitations, abd pain, n/v/d, constipation, dysuria, hematuria, rash, depression, anxiety or suicidal ideations    Past Medical History:   Diagnosis Date    ADHD (attention deficit hyperactivity disorder), 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    Cervical radiculopathy 1/22/2020    Generalized anxiety disorder 7/20/2012    Genital herpes 2/27/2013    Lumbar degenerative disc disease 9/18/2015    Major depressive disorder, single episode, mild (CMD) 1/9/2019    Marital or partner relational problem  2019    Migraine variant 6/3/2005    Neck pain 2020    Persistent depressive disorder 2017     Past Surgical History:   Procedure Laterality Date    Colonoscopy      Colonoscopy diagnostic  2021    Dilation and curettage of uterus      Tubal ligation      Upper gastrointestinal endoscopy       Family History   Problem Relation Age of Onset    Hypertension Mother     Cancer Father         brain tumor    Cancer Maternal Grandmother         bone     Social History     Socioeconomic History    Marital status: /Civil Union     Spouse name: Not on file    Number of children: Not on file    Years of education: Not on file    Highest education level: Not on file   Occupational History    Not on file   Tobacco Use    Smoking status: Former     Current packs/day: 0.00     Types: Cigarettes     Quit date: 2005     Years since quittin.0    Smokeless tobacco: Never   Vaping Use    Vaping Use: never used   Substance and Sexual Activity    Alcohol use: Yes     Alcohol/week: 4.0 standard drinks of alcohol     Types: 2 Glasses of wine, 2 Standard drinks or equivalent per week     Comment: Occ    Drug use: Yes     Frequency: 4.0 times per week     Types: Marijuana     Comment: inhaled    Sexual activity: Yes     Partners: Male     Birth control/protection: Surgical     Comment: tubal ligation   Other Topics Concern    Not on file   Social History Narrative    Not on file     Social Determinants of Health     Financial Resource Strain: Not on file   Food Insecurity: Not on file   Transportation Needs: Not on file   Physical Activity: Insufficiently Active (3/17/2021)    Exercise Vital Sign     Days of Exercise per Week: 2 days     Minutes of Exercise per Session: 30 min   Stress: Low Risk  (3/30/2021)    Stress     How Stressed: Not on file   Social Connections: Not on file   Interpersonal Safety: Not on file     Current Outpatient Medications   Medication Sig Dispense Refill    rizatriptan (MAXALT)  10 MG tablet Take 1 tablet by mouth as needed for Migraine. Take 1 tablet by mouth at onset of migraine. May repeat after 2 hours if needed. 30 tablet 1    amphetamine-dextroamphetamine (ADDERALL) 5 MG tablet Take 1 tablet by mouth daily. 30 tablet 0    valACYclovir (VALTREX) 500 MG tablet Take 1 tablet by mouth in the morning and 1 tablet in the evening. 30 tablet 3    clonazePAM (KlonoPIN) 0.5 MG tablet Take 1 tablet by mouth 2 times daily as needed for Anxiety. 40 tablet 0    clobetasol (TEMOVATE) 0.05 % ointment Apply topically 2 times daily. 15 g 4    calcium acetate 667 MG/5ML solution       B Complex-C (VITAMIN B + C COMPLEX PO)       Cyanocobalamin (B-12 PO)       MAGNESIUM OXIDE PO       TURMERIC PO       Fluticasone Propionate (FLONASE NA)       OMEGA-3 FATTY ACIDS PO Take  by mouth. - Oral       No current facility-administered medications for this visit.       ALLERGIES:  No Known Allergies    PE:  Vitals: BP 98/64   Pulse 69   Temp 97.4 °F (36.3 °C) (Temporal)   Ht 5' 8\" (1.727 m)   Wt 65.8 kg (145 lb)   LMP 04/01/2023 (Approximate)   BMI 22.05 kg/m²   BSA 1.78 m²    General:  Alert and oriented, NAD, normal ambulation without any assistance, appearance WNL  Eye:  Normal conjunctiva.    HENT:  Normocephalic, Tympanic membranes are clear.    Neck:  Supple, Non-tender, No thyromegaly.    Respiratory:  CTABL, Respirations are non-labored, Breath sounds are equal, Symmetrical chest wall expansion.    Cardiovascular:  Normal rate, Regular rhythm, No murmur.    Gastrointestinal:  Soft, Non-tender, Non-distended, No organomegaly.    Lymphatics:  No lymphadenopathy neck  Musculoskeletal:  Normal range of motion, Normal strength, No tenderness, No swelling.    Integumentary:  Warm, Pink.    Neurologic:  Alert and Oriented.    Cognition and Speech:  Oriented, Speech clear and coherent.    Psychiatric:  Cooperative, Appropriate mood & affect, Non-suicidal.    Assessment/Plan    1. Annual physical exam  -  Reach and stay at a healthy weight. This will lower your risk for many problems, such as obesity, diabetes, heart disease, and high blood pressure.  - Get at least 30 minutes of physical activity on most days of the week. Walking is a good choice. Discuss any changes in your exercise program with your doctor.  - Do not smoke or allow others to smoke around you. If you need help quitting, talk to your doctor about stop-smoking programs and medicines. These can increase your chances of quitting for good.  - Talk to your doctor about whether you have any risk factors for sexually transmitted infections (STIs). Having one sex partner (who does not have STIs and does not have sex with anyone else) is a good way to avoid these infections. Refused HIV/STD screening  - Protect your skin from too much sun. Wear sunglasses that block UV rays. Even when it's cloudy, put broad-spectrum sunscreen (SPF 30 or higher) on any exposed skin.  - See a dentist one or two times a year for checkups and to have your teeth cleaned.  - Wear a seat belt in the car.  - Drink alcohol in moderation, if at all. That means no more than 2 drinks a day for men and 1 drink a day for women.  - UTD with Tdap and other vaccines    - TETANUS DIPHTHERIA ACELLULAR PERTUSSIS VACC, 10+ YRS (BOOSTRIX)  - ZOSTER SHINGLES RECOMBINANT ADJUVANTED IM(SHINGRIX)    2. ADHD (attention deficit hyperactivity disorder), inattentive type  - controlled substance contract was reviewed by each line  - patient signed the contract  - will order UDS today  - discussed that patient can't be using any other drugs and I can order UDS at anytime  - if any part of the contract is not fulfilled, then I will not prescribe any controlled substance in the future  - patient expressed understanding and adderall was refilled  - f/u in 6 months    - amphetamine-dextroamphetamine (ADDERALL) 5 MG tablet; Take 1 tablet by mouth daily.  Dispense: 30 tablet; Refill: 0  - Pain Management,  7 Profile 1; Future    3. Multiple joint pain  -Given the symptoms of bilateral joint pain including hips, knees and hands rheumatoid arthritis cannot be ruled out  - We will check the autoimmune disease panel along with CRP and ESR  - Offered patient to see the rheumatologist, the patient declined  - We will also check uric acid level to rule out gout    - LIDIA Screen With Antibody And IFA Reflex; Future  - C Reactive Protein; Future  - Rheumatoid Factor; Future  - Sedimentation Rate; Future  - Uric Acid; Future    4. Generalized anxiety disorder  -Takes the clonazepam as needed    5. Bilateral hip pain  -Osteoarthritis versus rheumatoid arthritis  - Discussed at length that if his osteoarthritis is the only definitive treatment is replacement.  But you can take a Tylenol/NSAIDs for pain control as needed.  Will rule out RA as noted above    6. Migraine variant  -Rizatriptan as needed  - rizatriptan (MAXALT) 10 MG tablet; Take 1 tablet by mouth as needed for Migraine. Take 1 tablet by mouth at onset of migraine. May repeat after 2 hours if needed.  Dispense: 30 tablet; Refill: 1    7. Need for vaccination    - TETANUS DIPHTHERIA ACELLULAR PERTUSSIS VACC, 10+ YRS (BOOSTRIX)  - ZOSTER SHINGLES RECOMBINANT ADJUVANTED IM(SHINGRIX)

## 2024-11-29 NOTE — ED ADULT NURSE NOTE - CADM POA URETHRAL CATHETER
Copied from CRM #[id number]. Topic: MW Referral/Order - MW Referral/Order Request  >> Nov 29, 2024  2:19 PM Jacky ORIANA wrote:  EVELINA called regarding a Referral (or Service to Order).      New referral/ service-to order requested discussed previously with provider; Selected 'Wrap Up CRM' and created new Telephone Encounter after clicking 'Convert to Clinical Call'. Selected reason for call 'Referral'. Sent Referral message and routed as routine priority per Clinician KB page to appropriate clinician Pool.    -- DO NOT REPLY / DO NOT REPLY ALL --  -- This inbox is not monitored. If this was sent to the wrong provider or department, reroute message to P ECO Reroute pool. --  -- Message is from Engagement Center Operations (ECO) --    Order Request  Ultrasound of THE BREASTS     Message / reason: Routine check and history of breast cancer. And dense  Please contact patient when order is ready. When orders are ready please text patient if possible. If the ultrasound is advisable please contact patient to advise    Insurance type:   Payor: [facility] MEDICARE ADVANTAGE / Plan: [facility] MEDICARE ADVANTAGE PPO / Product Type: MEDICARE    Preferred Delivery Method  Input in Epic     Caller Information       Contact Date/Time Type Contact Phone/Fax    11/29/2024 02:00 PM CST Phone (Incoming) EVELINA [phone]            Alternative phone number: no    Can a detailed message be left? Yes   Patient has been advised the message will be addressed within 2-3 business days.      
No

## 2025-05-19 NOTE — ED ADULT NURSE NOTE - CHPI ED NUR SYMPTOMS NEG
no chills/no decreased eating/drinking/no dizziness/no fever/no nausea/no tingling/no vomiting/no weakness
Clothing